# Patient Record
Sex: FEMALE | URBAN - METROPOLITAN AREA
[De-identification: names, ages, dates, MRNs, and addresses within clinical notes are randomized per-mention and may not be internally consistent; named-entity substitution may affect disease eponyms.]

---

## 2023-04-05 DIAGNOSIS — E78.5 HYPERLIPIDEMIA, UNSPECIFIED HYPERLIPIDEMIA TYPE: Primary | ICD-10-CM

## 2023-04-06 RX ORDER — MOMETASONE FUROATE 1 MG/G
CREAM TOPICAL DAILY
COMMUNITY
Start: 2021-02-19 | End: 2023-05-26 | Stop reason: SDUPTHER

## 2023-04-06 RX ORDER — AMITRIPTYLINE HYDROCHLORIDE 50 MG/1
50 TABLET, FILM COATED ORAL NIGHTLY
COMMUNITY
Start: 2013-06-25

## 2023-04-06 RX ORDER — ATORVASTATIN CALCIUM 40 MG/1
40 TABLET, FILM COATED ORAL NIGHTLY
COMMUNITY
Start: 2015-11-02 | End: 2023-05-26 | Stop reason: SDUPTHER

## 2023-04-06 RX ORDER — ALBUTEROL SULFATE 90 UG/1
2 AEROSOL, METERED RESPIRATORY (INHALATION) EVERY 4 HOURS PRN
COMMUNITY
Start: 2015-06-01 | End: 2024-01-19 | Stop reason: ALTCHOICE

## 2023-04-06 RX ORDER — ATORVASTATIN CALCIUM 40 MG/1
40 TABLET, FILM COATED ORAL NIGHTLY
Qty: 30 TABLET | Refills: 0 | Status: SHIPPED | OUTPATIENT
Start: 2023-04-06 | End: 2024-01-19 | Stop reason: WASHOUT

## 2023-04-06 NOTE — TELEPHONE ENCOUNTER
Requested Prescriptions     Pending Prescriptions Disp Refills    atorvastatin (Lipitor) 40 mg tablet [Pharmacy Med Name: Atorvastatin Calcium 40 MG Oral Tablet] 30 tablet 0     Sig: Take 1 tablet (40 mg) by mouth once daily at bedtime.

## 2023-05-26 ENCOUNTER — LAB (OUTPATIENT)
Dept: LAB | Facility: LAB | Age: 53
End: 2023-05-26
Payer: COMMERCIAL

## 2023-05-26 ENCOUNTER — OFFICE VISIT (OUTPATIENT)
Dept: PRIMARY CARE | Facility: CLINIC | Age: 53
End: 2023-05-26
Payer: COMMERCIAL

## 2023-05-26 VITALS
DIASTOLIC BLOOD PRESSURE: 72 MMHG | SYSTOLIC BLOOD PRESSURE: 118 MMHG | HEIGHT: 67 IN | HEART RATE: 77 BPM | BODY MASS INDEX: 39.87 KG/M2 | WEIGHT: 254 LBS | RESPIRATION RATE: 18 BRPM | OXYGEN SATURATION: 97 %

## 2023-05-26 DIAGNOSIS — Z00.00 ENCOUNTER FOR HEALTH MAINTENANCE EXAMINATION: Primary | ICD-10-CM

## 2023-05-26 DIAGNOSIS — E55.9 VITAMIN D DEFICIENCY: ICD-10-CM

## 2023-05-26 DIAGNOSIS — E53.8 VITAMIN B12 DEFICIENCY: ICD-10-CM

## 2023-05-26 DIAGNOSIS — E78.5 HYPERLIPIDEMIA, UNSPECIFIED HYPERLIPIDEMIA TYPE: ICD-10-CM

## 2023-05-26 DIAGNOSIS — Z12.39 ENCOUNTER FOR SCREENING BREAST EXAMINATION: ICD-10-CM

## 2023-05-26 DIAGNOSIS — E66.9 OBESITY (BMI 35.0-39.9 WITHOUT COMORBIDITY): ICD-10-CM

## 2023-05-26 DIAGNOSIS — L30.9 DERMATITIS: ICD-10-CM

## 2023-05-26 DIAGNOSIS — D22.9 MULTIPLE PIGMENTED NEVI: ICD-10-CM

## 2023-05-26 DIAGNOSIS — Z00.00 ENCOUNTER FOR HEALTH MAINTENANCE EXAMINATION: ICD-10-CM

## 2023-05-26 LAB
ALANINE AMINOTRANSFERASE (SGPT) (U/L) IN SER/PLAS: 27 U/L (ref 7–45)
ALBUMIN (G/DL) IN SER/PLAS: 4.2 G/DL (ref 3.4–5)
ALKALINE PHOSPHATASE (U/L) IN SER/PLAS: 76 U/L (ref 33–110)
ANION GAP IN SER/PLAS: 10 MMOL/L (ref 10–20)
ASPARTATE AMINOTRANSFERASE (SGOT) (U/L) IN SER/PLAS: 18 U/L (ref 9–39)
BILIRUBIN TOTAL (MG/DL) IN SER/PLAS: 0.5 MG/DL (ref 0–1.2)
CALCIDIOL (25 OH VITAMIN D3) (NG/ML) IN SER/PLAS: 25 NG/ML
CALCIUM (MG/DL) IN SER/PLAS: 9 MG/DL (ref 8.6–10.3)
CARBON DIOXIDE, TOTAL (MMOL/L) IN SER/PLAS: 27 MMOL/L (ref 21–32)
CHLORIDE (MMOL/L) IN SER/PLAS: 106 MMOL/L (ref 98–107)
CHOLESTEROL (MG/DL) IN SER/PLAS: 133 MG/DL (ref 0–199)
CHOLESTEROL IN HDL (MG/DL) IN SER/PLAS: 35.3 MG/DL
CHOLESTEROL/HDL RATIO: 3.8
COBALAMIN (VITAMIN B12) (PG/ML) IN SER/PLAS: 2280 PG/ML (ref 211–911)
CREATININE (MG/DL) IN SER/PLAS: 0.84 MG/DL (ref 0.5–1.05)
ERYTHROCYTE DISTRIBUTION WIDTH (RATIO) BY AUTOMATED COUNT: 13.4 % (ref 11.5–14.5)
ERYTHROCYTE MEAN CORPUSCULAR HEMOGLOBIN CONCENTRATION (G/DL) BY AUTOMATED: 33 G/DL (ref 32–36)
ERYTHROCYTE MEAN CORPUSCULAR VOLUME (FL) BY AUTOMATED COUNT: 97 FL (ref 80–100)
ERYTHROCYTES (10*6/UL) IN BLOOD BY AUTOMATED COUNT: 5.16 X10E12/L (ref 4–5.2)
ESTIMATED AVERAGE GLUCOSE FOR HBA1C: 111 MG/DL
GFR FEMALE: 83 ML/MIN/1.73M2
GLUCOSE (MG/DL) IN SER/PLAS: 89 MG/DL (ref 74–99)
HEMATOCRIT (%) IN BLOOD BY AUTOMATED COUNT: 50.3 % (ref 36–46)
HEMOGLOBIN (G/DL) IN BLOOD: 16.6 G/DL (ref 12–16)
HEMOGLOBIN A1C/HEMOGLOBIN TOTAL IN BLOOD: 5.5 %
LDL: 87 MG/DL (ref 0–99)
LEUKOCYTES (10*3/UL) IN BLOOD BY AUTOMATED COUNT: 9.4 X10E9/L (ref 4.4–11.3)
PLATELETS (10*3/UL) IN BLOOD AUTOMATED COUNT: 216 X10E9/L (ref 150–450)
POTASSIUM (MMOL/L) IN SER/PLAS: 4.4 MMOL/L (ref 3.5–5.3)
PROTEIN TOTAL: 6.8 G/DL (ref 6.4–8.2)
SODIUM (MMOL/L) IN SER/PLAS: 139 MMOL/L (ref 136–145)
TRIGLYCERIDE (MG/DL) IN SER/PLAS: 55 MG/DL (ref 0–149)
UREA NITROGEN (MG/DL) IN SER/PLAS: 12 MG/DL (ref 6–23)
VLDL: 11 MG/DL (ref 0–40)

## 2023-05-26 PROCEDURE — 83036 HEMOGLOBIN GLYCOSYLATED A1C: CPT

## 2023-05-26 PROCEDURE — 85027 COMPLETE CBC AUTOMATED: CPT

## 2023-05-26 PROCEDURE — 82306 VITAMIN D 25 HYDROXY: CPT

## 2023-05-26 PROCEDURE — 99396 PREV VISIT EST AGE 40-64: CPT | Performed by: FAMILY MEDICINE

## 2023-05-26 PROCEDURE — 36415 COLL VENOUS BLD VENIPUNCTURE: CPT

## 2023-05-26 PROCEDURE — 82607 VITAMIN B-12: CPT

## 2023-05-26 PROCEDURE — 80053 COMPREHEN METABOLIC PANEL: CPT

## 2023-05-26 PROCEDURE — 80061 LIPID PANEL: CPT

## 2023-05-26 RX ORDER — MOMETASONE FUROATE 1 MG/G
CREAM TOPICAL DAILY
Qty: 45 G | Refills: 1 | Status: SHIPPED | OUTPATIENT
Start: 2023-05-26 | End: 2024-06-07 | Stop reason: SDUPTHER

## 2023-05-26 RX ORDER — ATORVASTATIN CALCIUM 40 MG/1
40 TABLET, FILM COATED ORAL NIGHTLY
Qty: 90 TABLET | Refills: 1 | Status: SHIPPED | OUTPATIENT
Start: 2023-05-26 | End: 2023-10-11

## 2023-05-26 ASSESSMENT — PATIENT HEALTH QUESTIONNAIRE - PHQ9
1. LITTLE INTEREST OR PLEASURE IN DOING THINGS: MORE THAN HALF THE DAYS
SUM OF ALL RESPONSES TO PHQ QUESTIONS 1-9: 7
3. TROUBLE FALLING OR STAYING ASLEEP OR SLEEPING TOO MUCH: SEVERAL DAYS
SUM OF ALL RESPONSES TO PHQ9 QUESTIONS 1 AND 2: 3
7. TROUBLE CONCENTRATING ON THINGS, SUCH AS READING THE NEWSPAPER OR WATCHING TELEVISION: NOT AT ALL
8. MOVING OR SPEAKING SO SLOWLY THAT OTHER PEOPLE COULD HAVE NOTICED. OR THE OPPOSITE, BEING SO FIGETY OR RESTLESS THAT YOU HAVE BEEN MOVING AROUND A LOT MORE THAN USUAL: NOT AT ALL
10. IF YOU CHECKED OFF ANY PROBLEMS, HOW DIFFICULT HAVE THESE PROBLEMS MADE IT FOR YOU TO DO YOUR WORK, TAKE CARE OF THINGS AT HOME, OR GET ALONG WITH OTHER PEOPLE: NOT DIFFICULT AT ALL
2. FEELING DOWN, DEPRESSED OR HOPELESS: SEVERAL DAYS
9. THOUGHTS THAT YOU WOULD BE BETTER OFF DEAD, OR OF HURTING YOURSELF: NOT AT ALL
6. FEELING BAD ABOUT YOURSELF - OR THAT YOU ARE A FAILURE OR HAVE LET YOURSELF OR YOUR FAMILY DOWN: SEVERAL DAYS
5. POOR APPETITE OR OVEREATING: NOT AT ALL
4. FEELING TIRED OR HAVING LITTLE ENERGY: MORE THAN HALF THE DAYS

## 2023-05-26 ASSESSMENT — ENCOUNTER SYMPTOMS
LOSS OF SENSATION IN FEET: 0
OCCASIONAL FEELINGS OF UNSTEADINESS: 0
DEPRESSION: 0

## 2023-05-26 NOTE — PROGRESS NOTES
Subjective   Patient ID: Nery Devi is a 52 y.o. female who presents for Annual Exam and Med Refill.    HPI   Patient comes in today with her  for physical exam.  She is currently without complaints.  She is taking her medicines as directed without problems.  She needs refills.    Family history: Mom  2022 85-year-old.  Patient has 3 brothers and 3 sisters all alive and well, patient is in the middle.  Father's health is unknown.        2022  Patient comes in today for physical exam. She is currently without complaints. She needs a refill on her medicines.    2021  Patient presents today for 6-month checkup and refill of meds. She currently is without complaints. She states that she is taking her medicines as directed and is not having any side effects from them. She would like a flu shot today.    2021  Patient presents today for 6-month checkup and refill of meds. She currently is without complaints. She states that she is taking her medicines as directed and is not having any side effects from them.    Patient is having problems with sore or tender drying and cracking of her fingers on both hands. 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 She has tried a number of ointments and creams for her hands all without much success.    Patient is having some problems with dry eyes and she is requesting something for that.    2020  Patient comes in today with her son and  for annual wellness physicals. She has paperwork from work that these be completed as well. She is currently without complaints.    2020   Nery Carter is here for flu symptoms, has been sick since Friday, has taken OTC with no relief. chronic cough, chest congestion. Has gotten worse over the weekend. She did have a fever last week but doesn't have one today. She describes her cough is a dry hacking cough and claims she has a sore throat probably from the coughing.  "She did get a flu shot this year. She denies earaches or GI symptoms.     Review of Systems   All other systems reviewed and are negative.      Objective   /72   Pulse 77   Resp 18   Ht 1.702 m (5' 7\")   Wt 115 kg (254 lb)   LMP 04/28/2023 (Approximate)   SpO2 97%   BMI 39.78 kg/m²     Physical Exam  Vitals reviewed.   Constitutional:       Appearance: She is well-developed. She is obese.      Comments: Severely obese 52-year-old  female with purple streaks in her hair   HENT:      Head: Normocephalic and atraumatic.      Right Ear: Tympanic membrane, ear canal and external ear normal.      Left Ear: Tympanic membrane, ear canal and external ear normal.      Nose: Nose normal.      Mouth/Throat:      Mouth: Mucous membranes are moist.      Pharynx: Oropharynx is clear.   Eyes:      Extraocular Movements: Extraocular movements intact.      Conjunctiva/sclera: Conjunctivae normal.      Pupils: Pupils are equal, round, and reactive to light.   Cardiovascular:      Rate and Rhythm: Normal rate and regular rhythm.      Heart sounds: Normal heart sounds. No murmur heard.  Pulmonary:      Effort: Pulmonary effort is normal.      Breath sounds: Normal breath sounds. No wheezing.   Abdominal:      General: Abdomen is flat. Bowel sounds are normal.      Palpations: Abdomen is soft.   Musculoskeletal:         General: Normal range of motion.   Skin:     General: Skin is warm and dry.   Neurological:      General: No focal deficit present.      Mental Status: She is alert and oriented to person, place, and time. Mental status is at baseline.   Psychiatric:         Mood and Affect: Mood normal.         Behavior: Behavior normal.         Thought Content: Thought content normal.         Judgment: Judgment normal.         Assessment/Plan   Problem List Items Addressed This Visit       Encounter for screening breast examination - Primary    Relevant Orders    BI mammo bilateral screening tomosynthesis    " Vitamin D deficiency    Relevant Orders    Vitamin D, Total    Hyperlipidemia    Relevant Medications    atorvastatin (Lipitor) 40 mg tablet    Other Relevant Orders    Lipid Panel    Vitamin B12 deficiency    Relevant Orders    CBC    Vitamin B12     Other Visit Diagnoses       Dermatitis        Relevant Medications    mometasone (Elocon) 0.1 % cream    Multiple pigmented nevi        Relevant Orders    Referral to Dermatology        Will contact patient with lab results when available.  Will contact patient with mammogram results when available.  Continue current meds as directed.  Follow up in 6 months for recheck if all remains stable, sooner if problems arise.  Medication list reconciled.  Thank you for visiting today!      Professional services: Some of this note was completed using Dragon voice technology and sometimes the software misinterprets words. This may include unintended errors with respect to translation of words, typographical errors or grammar errors which may not have been identified prior to finalization of the chart note. Please take this into account when reading the note. Thank you.

## 2023-06-09 ENCOUNTER — TELEPHONE (OUTPATIENT)
Dept: PRIMARY CARE | Facility: CLINIC | Age: 53
End: 2023-06-09
Payer: COMMERCIAL

## 2023-06-09 NOTE — TELEPHONE ENCOUNTER
----- Message from Phil Millan DO sent at 6/7/2023  8:21 AM EDT -----  Regarding: Labs  Please notify patient of high B12 of 2280.  Normal range is 211-911.  Would recommend stopping any B12 supplements for now.    Vitamin D level was low at 25.  It should be between 30 and 100 the closer to 50 the better. It is an important vitamin for your heart, lungs, immune system and bones among other things in your body. Would recommend OTC vitamin D3 2000 units daily to get it into and keep it in the normal range and recheck in October 2023.     All the rest of the labs are stable.  Continue current medicines and recheck in 1 year.  ----- Message -----  From: Lab, Background User  Sent: 5/26/2023  11:58 AM EDT  To: Phil Millan DO

## 2023-07-10 ENCOUNTER — OFFICE VISIT (OUTPATIENT)
Dept: PRIMARY CARE | Facility: CLINIC | Age: 53
End: 2023-07-10
Payer: COMMERCIAL

## 2023-07-10 VITALS
TEMPERATURE: 97.5 F | DIASTOLIC BLOOD PRESSURE: 80 MMHG | HEART RATE: 86 BPM | SYSTOLIC BLOOD PRESSURE: 119 MMHG | WEIGHT: 253 LBS | RESPIRATION RATE: 18 BRPM | BODY MASS INDEX: 39.63 KG/M2 | OXYGEN SATURATION: 96 %

## 2023-07-10 DIAGNOSIS — M79.642 LEFT HAND PAIN: Primary | ICD-10-CM

## 2023-07-10 PROCEDURE — 99213 OFFICE O/P EST LOW 20 MIN: CPT | Performed by: FAMILY MEDICINE

## 2023-07-10 RX ORDER — IBUPROFEN 800 MG/1
800 TABLET ORAL 3 TIMES DAILY PRN
Qty: 60 TABLET | Refills: 0 | Status: SHIPPED | OUTPATIENT
Start: 2023-07-10 | End: 2023-09-08

## 2023-07-10 ASSESSMENT — PAIN SCALES - GENERAL: PAINLEVEL: 8

## 2023-07-10 NOTE — PROGRESS NOTES
Subjective   Patient ID: Nery Devi is a 52 y.o. female who presents for Hand Injury (L palm/thumb area, fell approx 1 mo ago at the gas station trying to step over hose and slipped on oil. ).    HPI   Patient comes in today for evaluation of her left thumb.  She states that she fell about a month ago at a gas station as she was trying to step over a gas hose and slipped on oil on the pavement.  She fell onto her left hand and left arm.  Initially the arm was more painful than it is today however she is still having discomfort in the base of her thumb to palpation and pressure.  She states she can move the thumb okay in all directions but pressure over the thumb on the palmar surface is quite painful.    Review of Systems   All other systems reviewed and are negative.      Objective   /80   Pulse 86   Temp 36.4 °C (97.5 °F)   Resp 18   Wt 115 kg (253 lb)   LMP  (LMP Unknown)   SpO2 96%   BMI 39.63 kg/m²     Physical Exam  Vitals reviewed.   Constitutional:       Appearance: She is well-developed.   HENT:      Head: Normocephalic and atraumatic.      Right Ear: Tympanic membrane, ear canal and external ear normal.      Left Ear: Tympanic membrane, ear canal and external ear normal.      Nose: Nose normal.      Mouth/Throat:      Mouth: Mucous membranes are moist.      Pharynx: Oropharynx is clear.   Eyes:      Extraocular Movements: Extraocular movements intact.      Conjunctiva/sclera: Conjunctivae normal.      Pupils: Pupils are equal, round, and reactive to light.   Cardiovascular:      Rate and Rhythm: Normal rate and regular rhythm.      Heart sounds: Normal heart sounds. No murmur heard.  Pulmonary:      Effort: Pulmonary effort is normal.      Breath sounds: Normal breath sounds. No wheezing.   Abdominal:      General: Abdomen is flat. Bowel sounds are normal.      Palpations: Abdomen is soft.   Musculoskeletal:         General: Signs of injury (Pain to palpation over the palmar surface of  the base of the left thumb.) present. Normal range of motion.   Skin:     General: Skin is warm and dry.   Neurological:      General: No focal deficit present.      Mental Status: She is alert and oriented to person, place, and time. Mental status is at baseline.   Psychiatric:         Mood and Affect: Mood normal.         Behavior: Behavior normal.         Thought Content: Thought content normal.         Judgment: Judgment normal.         Assessment/Plan   Problem List Items Addressed This Visit    None  Visit Diagnoses       Left hand pain    -  Primary    Relevant Medications    ibuprofen 800 mg tablet    Other Relevant Orders    XR hand left 3+ views        Will contact patient with x-ray results when they are available.  Soak left hand in warm water with Epsom salts for 10-15 minutes 2-3 times a day for the next 7-10 days.   Use ibuprofen 800 mg 3 times a day with food for pain and swelling.  RTC in 10 days for recheck if not better, sooner if condition worsens.  Medication list reconciled.  Thank you for visiting today!      Professional services: Some of this note was completed using Dragon voice technology and sometimes the software misinterprets words. This may include unintended errors with respect to translation of words, typographical errors or grammar errors which may not have been identified prior to finalization of the chart note. Please take this into account when reading the note. Thank you.

## 2023-07-20 ENCOUNTER — TELEPHONE (OUTPATIENT)
Dept: PRIMARY CARE | Facility: CLINIC | Age: 53
End: 2023-07-20
Payer: COMMERCIAL

## 2023-07-20 NOTE — TELEPHONE ENCOUNTER
----- Message from Phil Millan DO sent at 7/20/2023  8:43 AM EDT -----  Regarding: Mammograms  Please notify patient of normal mammograms, recheck in 1 year.  ----- Message -----  From: [facility] - Radiology Results In  Sent: 7/19/2023   5:02 PM EDT  To: Phil Millan DO

## 2023-08-28 ENCOUNTER — OFFICE VISIT (OUTPATIENT)
Dept: PRIMARY CARE | Facility: CLINIC | Age: 53
End: 2023-08-28
Payer: COMMERCIAL

## 2023-08-28 VITALS
BODY MASS INDEX: 40.1 KG/M2 | TEMPERATURE: 97.8 F | WEIGHT: 256 LBS | SYSTOLIC BLOOD PRESSURE: 121 MMHG | OXYGEN SATURATION: 94 % | DIASTOLIC BLOOD PRESSURE: 83 MMHG | HEART RATE: 90 BPM | RESPIRATION RATE: 16 BRPM

## 2023-08-28 DIAGNOSIS — H10.33 ACUTE CONJUNCTIVITIS OF BOTH EYES, UNSPECIFIED ACUTE CONJUNCTIVITIS TYPE: Primary | ICD-10-CM

## 2023-08-28 DIAGNOSIS — E66.01 MORBID OBESITY WITH BODY MASS INDEX (BMI) OF 40.0 TO 44.9 IN ADULT (MULTI): ICD-10-CM

## 2023-08-28 PROCEDURE — 99213 OFFICE O/P EST LOW 20 MIN: CPT | Performed by: FAMILY MEDICINE

## 2023-08-28 RX ORDER — SULFACETAMIDE SODIUM 100 MG/ML
2 SOLUTION/ DROPS OPHTHALMIC 4 TIMES DAILY
Qty: 15 ML | Refills: 0 | Status: SHIPPED | OUTPATIENT
Start: 2023-08-28 | End: 2023-10-05

## 2023-08-28 NOTE — PROGRESS NOTES
Subjective   Patient ID: Nery Devi is a 53 y.o. female who presents for Eye Problem (Krishna eye problem, started getting itchy and watery on thursday. Stuck together in the morning. ).    HPI   Patient comes in today complaining of bilateral eye irritation.  She states both of her eyes were matted together and stuck together this morning when she got up.  Both conjunctiva are red.    Review of Systems   All other systems reviewed and are negative.      Objective   /83   Pulse 90   Temp 36.6 °C (97.8 °F)   Resp 16   Wt 116 kg (256 lb)   SpO2 94%   BMI 40.10 kg/m²     Physical Exam  Vitals reviewed.   Constitutional:       Appearance: She is morbidly obese.      Comments: Morbidly obese 53-year-old  female   HENT:      Head: Normocephalic and atraumatic.      Right Ear: Tympanic membrane, ear canal and external ear normal.      Left Ear: Tympanic membrane, ear canal and external ear normal.      Nose: Nose normal.      Mouth/Throat:      Mouth: Mucous membranes are moist.      Pharynx: Oropharynx is clear.   Eyes:      General:         Right eye: Discharge present.         Left eye: Discharge present.     Extraocular Movements: Extraocular movements intact.      Pupils: Pupils are equal, round, and reactive to light.      Comments: Redness and injection of both conjunctiva   Cardiovascular:      Rate and Rhythm: Normal rate and regular rhythm.      Heart sounds: Normal heart sounds. No murmur heard.  Pulmonary:      Effort: Pulmonary effort is normal.      Breath sounds: Normal breath sounds. No wheezing.   Abdominal:      General: Abdomen is flat. Bowel sounds are normal.      Palpations: Abdomen is soft.   Musculoskeletal:         General: Normal range of motion.   Skin:     General: Skin is warm and dry.   Neurological:      General: No focal deficit present.      Mental Status: She is alert and oriented to person, place, and time. Mental status is at baseline.   Psychiatric:         Mood  and Affect: Mood normal.         Behavior: Behavior normal.         Thought Content: Thought content normal.         Judgment: Judgment normal.         Assessment/Plan   Problem List Items Addressed This Visit       Morbid obesity with body mass index (BMI) of 40.0 to 44.9 in adult (CMS/[facility])    Acute conjunctivitis of both eyes - Primary    Relevant Medications    sulfacetamide (Bleph-10) 10 % ophthalmic solution   Use new meds as directed.  Followup in one week for recheck if all remains stable, sooner if problems arise.  Medication list reconciled.  Thank you for visiting today!      Professional services: Some of this note was completed using Dragon voice technology and sometimes the software misinterprets words. This may include unintended errors with respect to translation of words, typographical errors or grammar errors which may not have been identified prior to finalization of the chart note. Please take this into account when reading the note. Thank you.

## 2023-08-29 PROBLEM — E66.01 MORBID OBESITY WITH BODY MASS INDEX (BMI) OF 40.0 TO 44.9 IN ADULT (MULTI): Status: ACTIVE | Noted: 2023-08-29

## 2023-08-29 PROBLEM — H10.33 ACUTE CONJUNCTIVITIS OF BOTH EYES: Status: ACTIVE | Noted: 2023-08-29

## 2023-10-11 DIAGNOSIS — E53.8 VITAMIN B12 DEFICIENCY: ICD-10-CM

## 2023-10-11 DIAGNOSIS — E78.5 HYPERLIPIDEMIA, UNSPECIFIED HYPERLIPIDEMIA TYPE: ICD-10-CM

## 2023-10-11 DIAGNOSIS — E55.9 VITAMIN D DEFICIENCY: ICD-10-CM

## 2023-10-11 RX ORDER — ATORVASTATIN CALCIUM 40 MG/1
40 TABLET, FILM COATED ORAL NIGHTLY
Qty: 90 TABLET | Refills: 1 | Status: SHIPPED | OUTPATIENT
Start: 2023-10-11 | End: 2024-03-21

## 2023-10-11 NOTE — TELEPHONE ENCOUNTER
Requested Prescriptions     Pending Prescriptions Disp Refills    atorvastatin (Lipitor) 40 mg tablet [Pharmacy Med Name: Atorvastatin Calcium 40 MG Oral Tablet] 90 tablet 1     Sig: Take 1 tablet (40 mg) by mouth once daily at bedtime.     Pt also due for BW per [facility] (blue sticky/June results)

## 2023-10-13 ENCOUNTER — APPOINTMENT (OUTPATIENT)
Dept: PRIMARY CARE | Facility: CLINIC | Age: 53
End: 2023-10-13
Payer: COMMERCIAL

## 2023-10-13 ENCOUNTER — LAB (OUTPATIENT)
Dept: LAB | Facility: LAB | Age: 53
End: 2023-10-13
Payer: COMMERCIAL

## 2023-10-13 DIAGNOSIS — E55.9 VITAMIN D DEFICIENCY: ICD-10-CM

## 2023-10-13 DIAGNOSIS — E53.8 VITAMIN B12 DEFICIENCY: ICD-10-CM

## 2023-10-13 LAB
25(OH)D3 SERPL-MCNC: 39 NG/ML (ref 30–100)
VIT B12 SERPL-MCNC: 502 PG/ML (ref 211–911)

## 2023-10-13 PROCEDURE — 82607 VITAMIN B-12: CPT

## 2023-10-13 PROCEDURE — 36415 COLL VENOUS BLD VENIPUNCTURE: CPT

## 2023-10-13 PROCEDURE — 82306 VITAMIN D 25 HYDROXY: CPT

## 2024-01-19 ENCOUNTER — OFFICE VISIT (OUTPATIENT)
Dept: PRIMARY CARE | Facility: CLINIC | Age: 54
End: 2024-01-19
Payer: COMMERCIAL

## 2024-01-19 VITALS
SYSTOLIC BLOOD PRESSURE: 130 MMHG | DIASTOLIC BLOOD PRESSURE: 86 MMHG | TEMPERATURE: 98.4 F | BODY MASS INDEX: 41.82 KG/M2 | RESPIRATION RATE: 20 BRPM | WEIGHT: 267 LBS | HEART RATE: 92 BPM

## 2024-01-19 DIAGNOSIS — F17.200 SMOKER UNMOTIVATED TO QUIT: ICD-10-CM

## 2024-01-19 DIAGNOSIS — J20.9 ACUTE BRONCHITIS, UNSPECIFIED ORGANISM: Primary | ICD-10-CM

## 2024-01-19 PROCEDURE — 99213 OFFICE O/P EST LOW 20 MIN: CPT | Performed by: FAMILY MEDICINE

## 2024-01-19 PROCEDURE — 3008F BODY MASS INDEX DOCD: CPT | Performed by: FAMILY MEDICINE

## 2024-01-19 RX ORDER — AZITHROMYCIN 250 MG/1
TABLET, FILM COATED ORAL
Qty: 6 TABLET | Refills: 0 | Status: SHIPPED | OUTPATIENT
Start: 2024-01-19 | End: 2024-01-23

## 2024-01-19 NOTE — PROGRESS NOTES
Subjective   Patient ID: Nery Devi is a 53 y.o. female who presents for URI (Cough (wet but non productive), chest pain, throat irritation from coughing, sinus pain (dry irritation). Has been going on for about 3 days.).    HPI   Patient comes in today with her  Zia for evaluation of cough and congestion.  Her  also has the same symptoms.  He has had his for 2 weeks and she for 3 days.  Both are cigarette smokers.  She complains of a semiproductive cough as well as sinus congestion and drainage.  She did a COVID test yesterday which was negative.    8/28/2023  Patient comes in today complaining of bilateral eye irritation.  She states both of her eyes were matted together and stuck together this morning when she got up.  Both conjunctiva are red.    7/10/2023  Patient comes in today for evaluation of her left thumb.  She states that she fell about a month ago at a gas station as she was trying to step over a gas hose and slipped on oil on the pavement.  She fell onto her left hand and left arm.  Initially the arm was more painful than it is today however she is still having discomfort in the base of her thumb to palpation and pressure.  She states she can move the thumb okay in all directions but pressure over the thumb on the palmar surface is quite painful.    Review of Systems   All other systems reviewed and are negative.      Objective   /86   Pulse 92   Temp 36.9 °C (98.4 °F)   Resp 20   Wt 121 kg (267 lb)   BMI 41.82 kg/m²     Physical Exam  Vitals reviewed.   Constitutional:       Appearance: She is well-developed. She is morbidly obese.   HENT:      Head: Normocephalic and atraumatic.      Right Ear: Tympanic membrane, ear canal and external ear normal.      Left Ear: Tympanic membrane, ear canal and external ear normal.      Nose: Nose normal.      Mouth/Throat:      Mouth: Mucous membranes are moist.      Pharynx: Oropharynx is clear.   Eyes:      Extraocular Movements:  Extraocular movements intact.      Conjunctiva/sclera: Conjunctivae normal.      Pupils: Pupils are equal, round, and reactive to light.   Cardiovascular:      Rate and Rhythm: Normal rate and regular rhythm.      Heart sounds: Normal heart sounds. No murmur heard.  Pulmonary:      Effort: Pulmonary effort is normal.      Breath sounds: Rhonchi (Coarse scattered rhonchi bilateral lung fields without wheeze) present. No wheezing.   Abdominal:      General: Abdomen is flat. Bowel sounds are normal.      Palpations: Abdomen is soft.   Musculoskeletal:         General: Normal range of motion.   Skin:     General: Skin is warm and dry.   Neurological:      General: No focal deficit present.      Mental Status: She is alert and oriented to person, place, and time. Mental status is at baseline.   Psychiatric:         Mood and Affect: Mood normal.         Behavior: Behavior normal.         Thought Content: Thought content normal.         Judgment: Judgment normal.       Assessment/Plan   Problem List Items Addressed This Visit    None  Visit Diagnoses         Codes    Acute bronchitis, unspecified organism    -  Primary J20.9    Relevant Medications    azithromycin (Zithromax) 250 mg tablet    Smoker unmotivated to quit     F17.200        Use Mucinex DM 1 every 12 hrs as needed for cough  Use meds as directed.  Return to clinic if symptoms do not improve in 10-14 days.    Should the condition worsen contact me and return here or if after hours seek further evaluation at an urgent care or in the emergency room.  Medication list reconciled.  Thank you for visiting today!      Professional services: Some of this note was completed using Dragon voice technology and sometimes the software misinterprets words. This may include unintended errors with respect to translation of words, typographical errors or grammar errors which may not have been identified prior to finalization of the chart note. Please take this into account when  reading the note. Thank you.

## 2024-01-26 ENCOUNTER — TELEPHONE (OUTPATIENT)
Dept: PRIMARY CARE | Facility: CLINIC | Age: 54
End: 2024-01-26
Payer: COMMERCIAL

## 2024-01-26 DIAGNOSIS — J20.9 ACUTE BRONCHITIS, UNSPECIFIED ORGANISM: Primary | ICD-10-CM

## 2024-01-26 RX ORDER — AMOXICILLIN AND CLAVULANATE POTASSIUM 875; 125 MG/1; MG/1
875 TABLET, FILM COATED ORAL 2 TIMES DAILY
Qty: 20 TABLET | Refills: 0 | Status: SHIPPED | OUTPATIENT
Start: 2024-01-26 | End: 2024-02-05

## 2024-01-26 NOTE — TELEPHONE ENCOUNTER
Patient called stating that she has finished taking the antibiotic and that she is still not feeling any better.  Patient is asking if another antibiotic can be sent into her pharmacy or if she needs another appointment?  Patient can be reached at [phone].      Thank You

## 2024-01-26 NOTE — TELEPHONE ENCOUNTER
Please notify patient that a prescription for new antibiotic was sent to [facility].  Thank you.

## 2024-03-21 ENCOUNTER — OFFICE VISIT (OUTPATIENT)
Dept: PRIMARY CARE | Facility: CLINIC | Age: 54
End: 2024-03-21
Payer: COMMERCIAL

## 2024-03-21 VITALS
TEMPERATURE: 97.3 F | DIASTOLIC BLOOD PRESSURE: 82 MMHG | RESPIRATION RATE: 16 BRPM | HEART RATE: 89 BPM | SYSTOLIC BLOOD PRESSURE: 118 MMHG | WEIGHT: 262 LBS | OXYGEN SATURATION: 97 % | BODY MASS INDEX: 41.04 KG/M2

## 2024-03-21 DIAGNOSIS — K52.9 ACUTE GASTROENTERITIS: Primary | ICD-10-CM

## 2024-03-21 DIAGNOSIS — E78.5 HYPERLIPIDEMIA, UNSPECIFIED HYPERLIPIDEMIA TYPE: ICD-10-CM

## 2024-03-21 DIAGNOSIS — E66.01 MORBID OBESITY WITH BODY MASS INDEX (BMI) OF 40.0 TO 44.9 IN ADULT (MULTI): ICD-10-CM

## 2024-03-21 PROCEDURE — 99213 OFFICE O/P EST LOW 20 MIN: CPT | Performed by: FAMILY MEDICINE

## 2024-03-21 RX ORDER — ATORVASTATIN CALCIUM 40 MG/1
40 TABLET, FILM COATED ORAL NIGHTLY
Qty: 90 TABLET | Refills: 1 | Status: SHIPPED | OUTPATIENT
Start: 2024-03-21 | End: 2024-06-07 | Stop reason: SDUPTHER

## 2024-03-21 NOTE — LETTER
March 21, 2024     Patient: Nery Devi   YOB: 1970   Date of Visit: 3/21/2024       To Whom It May Concern:    Nery Devi was seen in my clinic on 3/21/2024 at 9:40 am. Please excuse Nery Carter for her absence from work on this day to make the appointment.    If you have any questions or concerns, please don't hesitate to call.         Sincerely,         Phil Millan,         CC: No Recipients

## 2024-03-21 NOTE — PROGRESS NOTES
Subjective   Patient ID: Nery Devi is a 53 y.o. female who presents for Abdominal Pain (Has had a stomach bug since Monday, she is having stomach cramps and diarrhea).    HPI   Today complaining of stomach pains.  She attended a [employer] party on Saturday with about 10 people and had some corned beef and on Sunday she states she felt fine.  She had some eggs to eat for Sunday breakfast.  Sunday night and Monday she felt terrible.  She had abdominal pain and diarrhea and vomiting.  She states she had emesis about 4 times Monday and diarrhea around 5 times.  She did not eat anything.  She claims her fever was 100.7 and she was shaking.    On Tuesday she did not have any diarrhea but she had not not much to eat.  Her sister who was at the party on Saturday got sick as well with GI symptoms.    On Wednesday patient did not have any symptoms but did not feel well.    Today she has been having stomach cramping and abdominal discomfort which she describes as a 7 out of 10.  She has not been to work all week.  She did try to go in today but was sent home after 30 minutes.  She did have 1 emesis last evening of mucus and today she is having a lot of belching and passing gas.  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  Her  also has the same symptoms.  He has had his for 2 weeks and she for 3 days.  Both are cigarette smokers.  She complains of a semiproductive cough as well as sinus congestion and drainage.  She did a COVID test yesterday which was negative.    8/28/2023  Patient comes in today complaining of bilateral eye irritation.  She states both of her eyes were matted together and stuck together this morning when she got up.  Both conjunctiva are red.    7/10/2023  Patient comes in today for evaluation of her left thumb.  She states that she fell about a month ago at a gas station as  she was trying to step over a gas hose and slipped on oil on the pavement.  She fell onto her left hand and left arm.  Initially the arm was more painful than it is today however she is still having discomfort in the base of her thumb to palpation and pressure.  She states she can move the thumb okay in all directions but pressure over the thumb on the palmar surface is quite painful.    2023  Patient comes in today with her  for physical exam.  She is currently without complaints.  She is taking her medicines as directed without problems.  She needs refills.    Family history: Mom  2022 85-year-old.  Patient has 3 brothers and 3 sisters all alive and well, patient is in the middle.  Father's health is unknown.    Review of Systems   All other systems reviewed and are negative.      Objective   /82   Pulse 89   Temp 36.3 °C (97.3 °F)   Resp 16   Wt 119 kg (262 lb)   LMP  (LMP Unknown)   SpO2 97%   BMI 41.04 kg/m²     Physical Exam  Vitals reviewed.   Constitutional:       Appearance: She is well-developed. She is morbidly obese.   HENT:      Head: Normocephalic and atraumatic.      Right Ear: Tympanic membrane, ear canal and external ear normal.      Left Ear: Tympanic membrane, ear canal and external ear normal.      Nose: Nose normal.      Mouth/Throat:      Mouth: Mucous membranes are moist.      Pharynx: Oropharynx is clear.   Eyes:      Extraocular Movements: Extraocular movements intact.      Conjunctiva/sclera: Conjunctivae normal.      Pupils: Pupils are equal, round, and reactive to light.   Cardiovascular:      Rate and Rhythm: Normal rate and regular rhythm.      Heart sounds: Normal heart sounds. No murmur heard.  Pulmonary:      Effort: Pulmonary effort is normal.      Breath sounds: Normal breath sounds. No wheezing.   Abdominal:      General: Abdomen is flat. Bowel sounds are normal.      Palpations: Abdomen is soft.   Musculoskeletal:         General: Normal range of  motion.   Skin:     General: Skin is warm and dry.   Neurological:      General: No focal deficit present.      Mental Status: She is alert and oriented to person, place, and time. Mental status is at baseline.   Psychiatric:         Mood and Affect: Mood normal.         Behavior: Behavior normal.         Thought Content: Thought content normal.         Judgment: Judgment normal.       Assessment/Plan   Problem List Items Addressed This Visit             ICD-10-CM    Hyperlipidemia E78.5    Relevant Medications    atorvastatin (Lipitor) 40 mg tablet    Morbid obesity with body mass index (BMI) of 40.0 to 44.9 in adult (CMS/[facility]) E66.01, Z68.41     Other Visit Diagnoses         Codes    Acute gastroenteritis    -  Primary K52.9        Clear liquid diet today (sprite, water, ginger ale, gatorade, etc).  Advance to a BRAT diet (bananas, rice, applesauce & toast) tomorrow and resume a regular diet on Saturday. Contact me if condition worsens or does not improve.  Note given for out of work until Monday.  Medication list reconciled.  Thank you for visiting today!      Professional services: Some of this note was completed using Dragon voice technology and sometimes the software misinterprets words. This may include unintended errors with respect to translation of words, typographical errors or grammar errors which may not have been identified prior to finalization of the chart note. Please take this into account when reading the note. Thank you.

## 2024-06-07 ENCOUNTER — OFFICE VISIT (OUTPATIENT)
Dept: PRIMARY CARE | Facility: CLINIC | Age: 54
End: 2024-06-07
Payer: COMMERCIAL

## 2024-06-07 ENCOUNTER — LAB (OUTPATIENT)
Dept: LAB | Facility: LAB | Age: 54
End: 2024-06-07
Payer: COMMERCIAL

## 2024-06-07 VITALS
HEART RATE: 89 BPM | SYSTOLIC BLOOD PRESSURE: 121 MMHG | OXYGEN SATURATION: 94 % | RESPIRATION RATE: 20 BRPM | BODY MASS INDEX: 40.81 KG/M2 | HEIGHT: 67 IN | WEIGHT: 260 LBS | DIASTOLIC BLOOD PRESSURE: 84 MMHG | TEMPERATURE: 97.8 F

## 2024-06-07 DIAGNOSIS — Z11.59 ENCOUNTER FOR HEPATITIS C SCREENING TEST FOR LOW RISK PATIENT: ICD-10-CM

## 2024-06-07 DIAGNOSIS — Z12.31 ENCOUNTER FOR SCREENING MAMMOGRAM FOR BREAST CANCER: ICD-10-CM

## 2024-06-07 DIAGNOSIS — E53.8 VITAMIN B12 DEFICIENCY: ICD-10-CM

## 2024-06-07 DIAGNOSIS — E78.5 HYPERLIPIDEMIA, UNSPECIFIED HYPERLIPIDEMIA TYPE: ICD-10-CM

## 2024-06-07 DIAGNOSIS — L30.9 DERMATITIS: ICD-10-CM

## 2024-06-07 DIAGNOSIS — Z00.00 ENCOUNTER FOR HEALTH MAINTENANCE EXAMINATION: ICD-10-CM

## 2024-06-07 DIAGNOSIS — Z00.00 ENCOUNTER FOR HEALTH MAINTENANCE EXAMINATION: Primary | ICD-10-CM

## 2024-06-07 DIAGNOSIS — E55.9 VITAMIN D DEFICIENCY: ICD-10-CM

## 2024-06-07 LAB
25(OH)D3 SERPL-MCNC: 35 NG/ML (ref 30–100)
ALBUMIN SERPL BCP-MCNC: 4.6 G/DL (ref 3.4–5)
ALP SERPL-CCNC: 95 U/L (ref 33–110)
ALT SERPL W P-5'-P-CCNC: 29 U/L (ref 7–45)
ANION GAP SERPL CALC-SCNC: 14 MMOL/L (ref 10–20)
AST SERPL W P-5'-P-CCNC: 21 U/L (ref 9–39)
BILIRUB SERPL-MCNC: 0.5 MG/DL (ref 0–1.2)
BUN SERPL-MCNC: 15 MG/DL (ref 6–23)
CALCIUM SERPL-MCNC: 9.7 MG/DL (ref 8.6–10.3)
CHLORIDE SERPL-SCNC: 106 MMOL/L (ref 98–107)
CHOLEST SERPL-MCNC: 149 MG/DL (ref 0–199)
CHOLESTEROL/HDL RATIO: 4.2
CO2 SERPL-SCNC: 25 MMOL/L (ref 21–32)
CREAT SERPL-MCNC: 0.88 MG/DL (ref 0.5–1.05)
EGFRCR SERPLBLD CKD-EPI 2021: 78 ML/MIN/1.73M*2
ERYTHROCYTE [DISTWIDTH] IN BLOOD BY AUTOMATED COUNT: 13.5 % (ref 11.5–14.5)
GLUCOSE SERPL-MCNC: 109 MG/DL (ref 74–99)
HCT VFR BLD AUTO: 51.2 % (ref 36–46)
HCV AB SER QL: NONREACTIVE
HDLC SERPL-MCNC: 35.9 MG/DL
HGB BLD-MCNC: 17.5 G/DL (ref 12–16)
LDLC SERPL CALC-MCNC: 95 MG/DL
MCH RBC QN AUTO: 32.8 PG (ref 26–34)
MCHC RBC AUTO-ENTMCNC: 34.2 G/DL (ref 32–36)
MCV RBC AUTO: 96 FL (ref 80–100)
NON HDL CHOLESTEROL: 113 MG/DL (ref 0–149)
NRBC BLD-RTO: 0 /100 WBCS (ref 0–0)
PLATELET # BLD AUTO: 235 X10*3/UL (ref 150–450)
POTASSIUM SERPL-SCNC: 4.5 MMOL/L (ref 3.5–5.3)
PROT SERPL-MCNC: 7.2 G/DL (ref 6.4–8.2)
RBC # BLD AUTO: 5.34 X10*6/UL (ref 4–5.2)
SODIUM SERPL-SCNC: 140 MMOL/L (ref 136–145)
TRIGL SERPL-MCNC: 92 MG/DL (ref 0–149)
VIT B12 SERPL-MCNC: 449 PG/ML (ref 211–911)
VLDL: 18 MG/DL (ref 0–40)
WBC # BLD AUTO: 10.3 X10*3/UL (ref 4.4–11.3)

## 2024-06-07 PROCEDURE — 99396 PREV VISIT EST AGE 40-64: CPT | Performed by: FAMILY MEDICINE

## 2024-06-07 PROCEDURE — 82607 VITAMIN B-12: CPT

## 2024-06-07 PROCEDURE — 82306 VITAMIN D 25 HYDROXY: CPT

## 2024-06-07 PROCEDURE — 86803 HEPATITIS C AB TEST: CPT

## 2024-06-07 PROCEDURE — 80061 LIPID PANEL: CPT

## 2024-06-07 PROCEDURE — 80053 COMPREHEN METABOLIC PANEL: CPT

## 2024-06-07 PROCEDURE — 85027 COMPLETE CBC AUTOMATED: CPT

## 2024-06-07 PROCEDURE — 36415 COLL VENOUS BLD VENIPUNCTURE: CPT

## 2024-06-07 RX ORDER — ATORVASTATIN CALCIUM 40 MG/1
40 TABLET, FILM COATED ORAL NIGHTLY
Qty: 90 TABLET | Refills: 1 | Status: SHIPPED | OUTPATIENT
Start: 2024-06-07

## 2024-06-07 RX ORDER — MOMETASONE FUROATE 1 MG/G
CREAM TOPICAL
Qty: 45 G | Refills: 1 | Status: SHIPPED | OUTPATIENT
Start: 2024-06-07

## 2024-06-07 NOTE — PROGRESS NOTES
"Subjective   Patient ID: Nery Devi is a 53 y.o. female who presents for Annual Exam (Still having problems with dry, itchy, cracking skin on her hands, and Elocon cream not working. /Patient reported health Good//Regular Dental Visits yes//Regular Eye Visits yes//Hearing Loss no//Balanced Diet yes//Weight Concerns yes//Exercise None///).  HPI  Patient comes in today for physical exam for work.  She is doing well overall.  Her biggest complaint is some dryness, cracking and itchy tips of the fingers of her right hand in particular.  She was given Elocon cream for the last visit and it seemed to help but temporarily.  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  She cannot wear gloves there because her hands sweat and the rash gets worse.    Patient also needs a refill of her amitriptyline.  Dr. Vallejo her neurologist has retired and he was prescribing it for her.    Patient's PHQ-9 score is 4 today which she describes as not difficult at all.    Review of Systems   All other systems reviewed and are negative.      Objective   Vitals:  /84   Pulse 89   Temp 36.6 °C (97.8 °F)   Resp 20   Ht 1.702 m (5' 7\")   Wt 118 kg (260 lb)   LMP  (LMP Unknown)   SpO2 94%   BMI 40.72 kg/m²     Physical Exam  Vitals reviewed.   Constitutional:       Appearance: She is well-developed. She is morbidly obese.   HENT:      Head: Normocephalic and atraumatic.      Right Ear: Tympanic membrane, ear canal and external ear normal.      Left Ear: Tympanic membrane, ear canal and external ear normal.      Nose: Nose normal.      Mouth/Throat:      Mouth: Mucous membranes are moist.      Pharynx: Oropharynx is clear.   Eyes:      Extraocular Movements: Extraocular movements intact.      Conjunctiva/sclera: Conjunctivae normal.      Pupils: Pupils are equal, round, and reactive to light.   Cardiovascular:      Rate and Rhythm: Normal rate and regular rhythm.      Heart sounds: Normal heart sounds. " No murmur heard.  Pulmonary:      Effort: Pulmonary effort is normal.      Breath sounds: Normal breath sounds. No wheezing.   Abdominal:      General: Abdomen is flat. Bowel sounds are normal.      Palpations: Abdomen is soft.   Musculoskeletal:         General: Normal range of motion.   Skin:     General: Skin is warm and dry.      Findings: Rash (Rash of the distal tips of the ring, middle and index finger of the right hand) present.   Neurological:      General: No focal deficit present.      Mental Status: She is alert and oriented to person, place, and time. Mental status is at baseline.   Psychiatric:         Mood and Affect: Mood normal.         Behavior: Behavior normal.         Thought Content: Thought content normal.         Judgment: Judgment normal.       Assessment/Plan   Problem List Items Addressed This Visit       Vitamin D deficiency    Relevant Orders    Vitamin D 25-Hydroxy,Total (for eval of Vitamin D levels) (Completed)    Hyperlipidemia    Relevant Medications    atorvastatin (Lipitor) 40 mg tablet    Other Relevant Orders    Lipid Panel (Completed)    Vitamin B12 deficiency - Primary    Relevant Orders    CBC (Completed)    Vitamin B12 (Completed)    Dermatitis    Relevant Medications    mometasone (Elocon) 0.1 % cream     Other Visit Diagnoses       Encounter for health maintenance examination        Relevant Orders    Comprehensive Metabolic Panel (Completed)    Encounter for hepatitis C screening test for low risk patient        Relevant Orders    Hepatitis C Antibody (Completed)    Encounter for screening mammogram for breast cancer        Relevant Orders    BI mammo bilateral screening tomosynthesis        Will contact patient with lab results when available.  Will contact patient with mammogram results when available.  Patient encouraged to do Cologuard test  Wellness form will be completed with lab values once obtained and sent to patient's workplace. A copy of the form will be made  and placed in the patient's chart.  Medication list reconciled.  Thank you for visiting today!      Professional services: Some of this note was completed using Dragon voice technology and sometimes the software misinterprets words. This may include unintended errors with respect to translation of words, typographical errors or grammar errors which may not have been identified prior to finalization of the chart note. Please take this into account when reading the note. Thank you.               Phil Millan DO 06/08/24 1:41 PM

## 2024-06-10 NOTE — RESULT ENCOUNTER NOTE
Please notify patient of slightly high blood sugar of 109.  Recommend watching sugar, carbs and starches in the diet.    Hemoglobin is high, will need to recheck this again in October.    Vitamin D level was low normal at 35.  It should be between 30 and 100 the closer to 50 the better. It is an important vitamin for your heart, lungs, immune system and bones among other things in your body. Would recommend over the counter vitamin D3 2000 units daily to get it into and keep it in the normal range and recheck in October 2024.     All the rest of the labs were good recheck again in a year.

## 2024-06-12 ENCOUNTER — TELEPHONE (OUTPATIENT)
Dept: PRIMARY CARE | Facility: CLINIC | Age: 54
End: 2024-06-12
Payer: COMMERCIAL

## 2024-06-12 NOTE — TELEPHONE ENCOUNTER
----- Message from Phil Millan DO sent at 6/10/2024  7:58 AM EDT -----  Please notify patient of slightly high blood sugar of 109.  Recommend watching sugar, carbs and starches in the diet.    Hemoglobin is high, will need to recheck this again in October.    Vitamin D level was low normal at 35.  It should be between 30 and 100 the closer to 50 the better. It is an important vitamin for your heart, lungs, immune system and bones among other things in your body. Would recommend over the counter vitamin D3 2000 units daily to get it into and keep it in the normal range and recheck in October 2024.     All the rest of the labs were good recheck again in a year.

## 2024-08-29 ENCOUNTER — OFFICE VISIT (OUTPATIENT)
Dept: PRIMARY CARE | Facility: CLINIC | Age: 54
End: 2024-08-29
Payer: COMMERCIAL

## 2024-08-29 VITALS
HEART RATE: 85 BPM | WEIGHT: 264 LBS | OXYGEN SATURATION: 95 % | DIASTOLIC BLOOD PRESSURE: 86 MMHG | RESPIRATION RATE: 20 BRPM | BODY MASS INDEX: 41.35 KG/M2 | SYSTOLIC BLOOD PRESSURE: 122 MMHG | TEMPERATURE: 98.2 F

## 2024-08-29 DIAGNOSIS — J01.80 ACUTE NON-RECURRENT SINUSITIS OF OTHER SINUS: Primary | ICD-10-CM

## 2024-08-29 LAB — POC SARS-COV-2 AG BINAX: NORMAL

## 2024-08-29 PROCEDURE — 99213 OFFICE O/P EST LOW 20 MIN: CPT | Performed by: FAMILY MEDICINE

## 2024-08-29 PROCEDURE — 87811 SARS-COV-2 COVID19 W/OPTIC: CPT | Performed by: FAMILY MEDICINE

## 2024-08-29 RX ORDER — AZITHROMYCIN 250 MG/1
TABLET, FILM COATED ORAL
Qty: 6 TABLET | Refills: 0 | Status: SHIPPED | OUTPATIENT
Start: 2024-08-29 | End: 2024-08-29

## 2024-08-29 RX ORDER — AZITHROMYCIN 250 MG/1
TABLET, FILM COATED ORAL
Qty: 6 TABLET | Refills: 0 | Status: SHIPPED | OUTPATIENT
Start: 2024-08-29 | End: 2024-09-03

## 2024-08-29 ASSESSMENT — PATIENT HEALTH QUESTIONNAIRE - PHQ9
2. FEELING DOWN, DEPRESSED OR HOPELESS: NOT AT ALL
SUM OF ALL RESPONSES TO PHQ9 QUESTIONS 1 AND 2: 0
1. LITTLE INTEREST OR PLEASURE IN DOING THINGS: NOT AT ALL

## 2024-08-29 ASSESSMENT — ENCOUNTER SYMPTOMS
LOSS OF SENSATION IN FEET: 0
OCCASIONAL FEELINGS OF UNSTEADINESS: 0
DEPRESSION: 0

## 2024-08-29 NOTE — PROGRESS NOTES
Subjective   Patient ID: Nery Devi is a 54 y.o. female who presents for Sinus Problem (Pressure and drainage since Sunday. Nausea. Fever this morning.  She cannot blow any mucus out of her nose. ).    HPI  Patient comes in today complaining of sinus congestion, pressure, low-grade fever and chills since Sunday.  She did not test for COVID.  She denies sore throat.  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  Her  and son have not been sick.  She has a graduation party this weekend for her son who is going into the [employer].    Review of Systems   All other systems reviewed and are negative.      Objective   Vitals:  /86   Pulse 85   Temp 36.8 °C (98.2 °F)   Resp 20   Wt 120 kg (264 lb)   LMP  (LMP Unknown)   SpO2 95%   BMI 41.35 kg/m²     Physical Exam  Vitals reviewed.   Constitutional:       Appearance: She is well-developed. She is morbidly obese.   HENT:      Head: Normocephalic and atraumatic.      Right Ear: Tympanic membrane, ear canal and external ear normal.      Left Ear: Tympanic membrane, ear canal and external ear normal.      Nose: Congestion and rhinorrhea present.      Mouth/Throat:      Mouth: Mucous membranes are moist.      Pharynx: Oropharynx is clear.   Eyes:      Extraocular Movements: Extraocular movements intact.      Conjunctiva/sclera: Conjunctivae normal.      Pupils: Pupils are equal, round, and reactive to light.   Cardiovascular:      Rate and Rhythm: Normal rate and regular rhythm.      Heart sounds: Normal heart sounds. No murmur heard.  Pulmonary:      Effort: Pulmonary effort is normal.      Breath sounds: Normal breath sounds. No wheezing.   Abdominal:      General: Abdomen is flat. Bowel sounds are normal.      Palpations: Abdomen is soft.   Musculoskeletal:         General: Normal range of motion.   Skin:     General: Skin is warm and dry.      Comments: Patient has multiple ear piercings and tattoos   Neurological:      General: No  focal deficit present.      Mental Status: She is alert and oriented to person, place, and time. Mental status is at baseline.   Psychiatric:         Mood and Affect: Mood normal.         Behavior: Behavior normal.         Thought Content: Thought content normal.         Judgment: Judgment normal.         Assessment/Plan   Problem List Items Addressed This Visit    None  Visit Diagnoses       Acute non-recurrent sinusitis of other sinus    -  Primary    Relevant Medications    azithromycin (Zithromax) 250 mg tablet    Other Relevant Orders    POCT BinaxNOW Covid-19 Ag Card manually resulted (Completed)        Use Allegra-D 180 mg 1 every 24 hours  Use meds as directed.  Return to clinic if symptoms do not improve in 10-14 days.    Should the condition worsen contact me and return here or if after hours seek further evaluation at an urgent care or in the emergency room.  Medication list reconciled.  Thank you for visiting today!      Professional services: Some of this note was completed using Dragon voice technology and sometimes the software misinterprets words. This may include unintended errors with respect to translation of words, typographical errors or grammar errors which may not have been identified prior to finalization of the chart note. Please take this into account when reading the note. Thank you.       Phil Millan DO 08/31/24 10:20 AM

## 2024-09-05 ENCOUNTER — TELEPHONE (OUTPATIENT)
Dept: PRIMARY CARE | Facility: CLINIC | Age: 54
End: 2024-09-05
Payer: COMMERCIAL

## 2024-09-05 DIAGNOSIS — J01.80 ACUTE NON-RECURRENT SINUSITIS OF OTHER SINUS: Primary | ICD-10-CM

## 2024-09-05 RX ORDER — AMOXICILLIN AND CLAVULANATE POTASSIUM 875; 125 MG/1; MG/1
875 TABLET, FILM COATED ORAL 2 TIMES DAILY
Qty: 14 TABLET | Refills: 0 | Status: SHIPPED | OUTPATIENT
Start: 2024-09-05 | End: 2024-09-12

## 2024-09-05 NOTE — TELEPHONE ENCOUNTER
Please notify patient that a prescription for new antibiotic was sent to her [facility] pharmacy at [facility].  Thank you.

## 2024-09-05 NOTE — TELEPHONE ENCOUNTER
Patient states that she was in last week for a sinus infection and finished her antibiotic on Tuesday, but she states that it is still not clearing up and she is asking if a prescription can be sent to her pharmacy?  Patient can be reached at [phone]      Thank You

## 2025-02-07 ENCOUNTER — OFFICE VISIT (OUTPATIENT)
Dept: PRIMARY CARE | Facility: CLINIC | Age: 55
End: 2025-02-07
Payer: COMMERCIAL

## 2025-02-07 VITALS
SYSTOLIC BLOOD PRESSURE: 131 MMHG | WEIGHT: 273 LBS | BODY MASS INDEX: 42.76 KG/M2 | RESPIRATION RATE: 20 BRPM | TEMPERATURE: 98.1 F | DIASTOLIC BLOOD PRESSURE: 87 MMHG | HEART RATE: 87 BPM

## 2025-02-07 DIAGNOSIS — J20.9 ACUTE BRONCHITIS, UNSPECIFIED ORGANISM: Primary | ICD-10-CM

## 2025-02-07 DIAGNOSIS — E66.01 MORBID OBESITY WITH BODY MASS INDEX (BMI) OF 40.0 TO 44.9 IN ADULT (MULTI): ICD-10-CM

## 2025-02-07 PROCEDURE — 99214 OFFICE O/P EST MOD 30 MIN: CPT | Performed by: FAMILY MEDICINE

## 2025-02-07 RX ORDER — SEMAGLUTIDE 0.25 MG/.5ML
0.25 INJECTION, SOLUTION SUBCUTANEOUS
Qty: 2 ML | Refills: 0 | Status: SHIPPED | OUTPATIENT
Start: 2025-02-09 | End: 2025-03-03

## 2025-02-07 RX ORDER — DOXYCYCLINE 100 MG/1
100 CAPSULE ORAL 2 TIMES DAILY
Qty: 20 CAPSULE | Refills: 0 | Status: SHIPPED | OUTPATIENT
Start: 2025-02-07

## 2025-02-07 ASSESSMENT — ENCOUNTER SYMPTOMS
DEPRESSION: 0
LOSS OF SENSATION IN FEET: 0
OCCASIONAL FEELINGS OF UNSTEADINESS: 0

## 2025-02-07 NOTE — PROGRESS NOTES
Subjective   Patient ID: Nery Devi is a 54 y.o. female who presents for Sinus Problem (Sick for over 1 week. Started into the sinus this week. Covid negative Wednesday. Sinus congestion and pressure. Cannot get much mucus out but green, coughing a bit from drainage.).    HPI  Patient comes in today complaining of being sick for the last 1 to 2 weeks.  This started out his cold symptoms and have gradually gotten worse.  This past Wednesday she could barely get out of bed.  She states her head feels blocked and her cough is productive of brown sputum.  She is a smoker of a pack a day.  She has no desire to quit at this time.  She did test for COVID on Wednesday and it was negative.    Patient is interested in medication for weight loss.  She states in the past she has tried the keto diet and they work but she has regained the weight.  She was hoping to try the semaglutide medications that are highly advertised today.    Review of Systems   All other systems reviewed and are negative.      Objective   Vitals:  /87   Pulse 87   Temp 36.7 °C (98.1 °F)   Resp 20   Wt 124 kg (273 lb)   LMP  (LMP Unknown)   BMI 42.76 kg/m²     Physical Exam  Vitals reviewed.   Constitutional:       Appearance: She is well-developed. She is morbidly obese.   HENT:      Head: Normocephalic and atraumatic.      Right Ear: Tympanic membrane, ear canal and external ear normal.      Left Ear: Tympanic membrane, ear canal and external ear normal.      Nose: Congestion and rhinorrhea present.      Mouth/Throat:      Mouth: Mucous membranes are moist.      Pharynx: Oropharynx is clear.   Eyes:      Extraocular Movements: Extraocular movements intact.      Conjunctiva/sclera: Conjunctivae normal.      Pupils: Pupils are equal, round, and reactive to light.   Cardiovascular:      Rate and Rhythm: Normal rate and regular rhythm.      Heart sounds: Normal heart sounds. No murmur heard.  Pulmonary:      Effort: Pulmonary effort is  normal.      Breath sounds: Rhonchi (Mild scattered rhonchi bilateral lung fields without wheeze) present. No wheezing.   Abdominal:      General: Abdomen is flat. Bowel sounds are normal.      Palpations: Abdomen is soft.   Musculoskeletal:         General: Normal range of motion.   Skin:     General: Skin is warm and dry.      Comments: Patient has multiple ear piercings and tattoos   Neurological:      General: No focal deficit present.      Mental Status: She is alert and oriented to person, place, and time. Mental status is at baseline.   Psychiatric:         Mood and Affect: Mood normal.         Behavior: Behavior normal.         Thought Content: Thought content normal.         Judgment: Judgment normal.       Assessment/Plan   Problem List Items Addressed This Visit       Morbid obesity with body mass index (BMI) of 40.0 to 44.9 in adult (Multi)    Relevant Medications    semaglutide, weight loss, (Wegovy) 0.25 mg/0.5 mL pen injector (Start on 2/9/2025)     Other Visit Diagnoses       Acute bronchitis, unspecified organism    -  Primary    Relevant Medications    doxycycline (Monodox) 100 mg capsule        Use Mucinex DM 1 every 12 hrs as needed for cough  Behavioral counseling for weight loss done today.  I would recommend that you track your calories daily for the next 2 weeks.  If you have Internet access, you may want to try myfitness [email or web address] to track your caloric intake and calories burned each day.  It is FREE to join.  Afterward, there are 3500 sixto in a pound of fat so try and restrict your caloric intake to 500 sixto less than your average over the 2-week period of time.  Example: If you consumed an average of 2000 sixto a day during those 2 weeks then restrict your calorie intake to no more than 1500 calories a day.  By doing so, 500 sixto x 7 days a week equals 3500 sixto and thus you should lose at least a pound a week.      Also exercise is extremely important.  Try to exercise at least 3-5 times a week  for 30 minutes each.  Ultimate goal would be to try to achieve 10,000 steps a day.    Try to drink 32-64 ounces of water a day.    Return to clinic in one month for recheck, sooner should problems arise  Patient to return to clinic 1 month after starting weight loss medication  Medication list reconciled.  Thank you for visiting today!      Professional services: Some of this note was completed using Dragon voice technology and sometimes the software misinterprets words. This may include unintended errors with respect to translation of words, typographical errors or grammar errors which may not have been identified prior to finalization of the chart note. Please take this into account when reading the note. Thank you.       Phil Millan DO 02/07/25 4:29 PM

## 2025-02-14 ENCOUNTER — TELEPHONE (OUTPATIENT)
Dept: PRIMARY CARE | Facility: CLINIC | Age: 55
End: 2025-02-14
Payer: COMMERCIAL

## 2025-02-14 DIAGNOSIS — E66.01 MORBID OBESITY (MULTI): Primary | ICD-10-CM

## 2025-02-14 DIAGNOSIS — E66.01 MORBID OBESITY WITH BMI OF 40.0-44.9, ADULT (MULTI): ICD-10-CM

## 2025-02-14 NOTE — TELEPHONE ENCOUNTER
Patient called in this morning and stated that the Wegovy was going to cost her over 1300.00 out of pocket.  Patient is asking what she should do next?  Patient can be reached at [phone].      Thank You

## 2025-02-14 NOTE — TELEPHONE ENCOUNTER
Please notify patient that I have put in a referral to clinical pharmacy, [facility] who will contact her regarding other medication options for weight loss with her insurance.  Thank you.

## 2025-03-07 DIAGNOSIS — R51.9 CHRONIC INTRACTABLE HEADACHE, UNSPECIFIED HEADACHE TYPE: ICD-10-CM

## 2025-03-07 DIAGNOSIS — G89.29 CHRONIC INTRACTABLE HEADACHE, UNSPECIFIED HEADACHE TYPE: ICD-10-CM

## 2025-03-07 RX ORDER — AMITRIPTYLINE HYDROCHLORIDE 50 MG/1
50 TABLET, FILM COATED ORAL NIGHTLY
Qty: 90 TABLET | Refills: 1 | OUTPATIENT
Start: 2025-03-07 | End: 2025-09-03

## 2025-03-07 NOTE — TELEPHONE ENCOUNTER
Rx Refill Request Telephone Encounter    Name:  Nery Devi  :  [id number]  Medication Name:  amitriptyline (Elavil) 50 mg tablet     Specific Pharmacy location:    [facility] - [address] Phone: [phone]   Fax: [phone]        Date of last appointment:  25  Date of next appointment:  25  Best number to reach patient:  [phone]          Thank You

## 2025-03-07 NOTE — TELEPHONE ENCOUNTER
Pt states she used to get this medication from her neurologist for her headaches, but he has retired and requests that PCP take over filling this medication.     Requested Prescriptions     Pending Prescriptions Disp Refills    amitriptyline (Elavil) 50 mg tablet 90 tablet 1     Sig: Take 1 tablet (50 mg) by mouth once daily at bedtime.

## 2025-03-07 NOTE — TELEPHONE ENCOUNTER
Has not been filled I what looks like 10 years. LMTCB regarding this request and what she needs it for (possibly insomnia?)

## 2025-03-18 ENCOUNTER — APPOINTMENT (OUTPATIENT)
Dept: PHARMACY | Facility: HOSPITAL | Age: 55
End: 2025-03-18
Payer: COMMERCIAL

## 2025-03-18 DIAGNOSIS — E66.01 MORBID OBESITY WITH BMI OF 40.0-44.9, ADULT (MULTI): ICD-10-CM

## 2025-03-19 NOTE — PROGRESS NOTES
Subjective   Patient ID: Nery Devi is a 54 y.o. female who presents for No chief complaint on file..    Referring Provider: Phil Millan DO     HPI    Review of Systems    Objective     There were no vitals taken for this visit.     Labs  Lab Results   Component Value Date    BILITOT 0.5 06/07/2024    CALCIUM 9.7 06/07/2024    CO2 25 06/07/2024     06/07/2024    CREATININE 0.88 06/07/2024    GLUCOSE 109 (H) 06/07/2024    ALKPHOS 95 06/07/2024    K 4.5 06/07/2024    PROT 7.2 06/07/2024     06/07/2024    AST 21 06/07/2024    ALT 29 06/07/2024    BUN 15 06/07/2024    ANIONGAP 14 06/07/2024    ALBUMIN 4.6 06/07/2024    GFRF 83 05/26/2023     Lab Results   Component Value Date    TRIG 92 06/07/2024    CHOL 149 06/07/2024    LDLCALC 95 06/07/2024    HDL 35.9 06/07/2024     Lab Results   Component Value Date    HGBA1C 5.5 05/26/2023       Assessment/Plan   Patient was referred for weight loss and management with a GLP-1. Will submit a PA and will follow up in 1 week.    PLAN:  1. Follow up in 1 week    Continue all meds under the continuation of care with the referring provider and clinical pharmacy team.    Dahlia Chun, PharmD

## 2025-03-25 ENCOUNTER — APPOINTMENT (OUTPATIENT)
Dept: PHARMACY | Facility: HOSPITAL | Age: 55
End: 2025-03-25
Payer: COMMERCIAL

## 2025-03-25 DIAGNOSIS — E66.01 MORBID OBESITY WITH BMI OF 40.0-44.9, ADULT (MULTI): ICD-10-CM

## 2025-03-27 NOTE — PROGRESS NOTES
Subjective   Patient ID: Nery Devi is a 54 y.o. female who presents for No chief complaint on file..    Referring Provider: Phil Millan DO     HPI    Review of Systems    Objective     There were no vitals taken for this visit.     Labs  Lab Results   Component Value Date    BILITOT 0.5 06/07/2024    CALCIUM 9.7 06/07/2024    CO2 25 06/07/2024     06/07/2024    CREATININE 0.88 06/07/2024    GLUCOSE 109 (H) 06/07/2024    ALKPHOS 95 06/07/2024    K 4.5 06/07/2024    PROT 7.2 06/07/2024     06/07/2024    AST 21 06/07/2024    ALT 29 06/07/2024    BUN 15 06/07/2024    ANIONGAP 14 06/07/2024    ALBUMIN 4.6 06/07/2024    GFRF 83 05/26/2023     Lab Results   Component Value Date    TRIG 92 06/07/2024    CHOL 149 06/07/2024    LDLCALC 95 06/07/2024    HDL 35.9 06/07/2024     Lab Results   Component Value Date    HGBA1C 5.5 05/26/2023       Assessment/Plan   Patient PA for Wegovy was denied as it is not on their drug formulary. Discussed alternative options and she would like to consider Adipex. Patient has an upcoming appointment in April, and will discuss further with PCP. Will follow up as needed.    PLAN:  1. Follow up as needed    Continue all meds under the continuation of care with the referring provider and clinical pharmacy team.    Dahlia Chun, PharmD

## 2025-04-18 ENCOUNTER — APPOINTMENT (OUTPATIENT)
Dept: RADIOLOGY | Facility: CLINIC | Age: 55
End: 2025-04-18
Payer: COMMERCIAL

## 2025-04-18 ENCOUNTER — APPOINTMENT (OUTPATIENT)
Dept: PRIMARY CARE | Facility: CLINIC | Age: 55
End: 2025-04-18
Payer: COMMERCIAL

## 2025-04-18 VITALS
DIASTOLIC BLOOD PRESSURE: 85 MMHG | TEMPERATURE: 97.8 F | HEART RATE: 80 BPM | RESPIRATION RATE: 20 BRPM | BODY MASS INDEX: 43.76 KG/M2 | HEIGHT: 67 IN | SYSTOLIC BLOOD PRESSURE: 128 MMHG | WEIGHT: 278.8 LBS

## 2025-04-18 DIAGNOSIS — Z13.228 SCREENING FOR METABOLIC DISORDER: ICD-10-CM

## 2025-04-18 DIAGNOSIS — E55.9 VITAMIN D DEFICIENCY: ICD-10-CM

## 2025-04-18 DIAGNOSIS — E66.01 MORBID OBESITY WITH BODY MASS INDEX (BMI) OF 40.0 TO 44.9 IN ADULT (MULTI): ICD-10-CM

## 2025-04-18 DIAGNOSIS — E78.5 HYPERLIPIDEMIA, UNSPECIFIED HYPERLIPIDEMIA TYPE: ICD-10-CM

## 2025-04-18 DIAGNOSIS — Z79.899 MEDICATION MANAGEMENT: ICD-10-CM

## 2025-04-18 DIAGNOSIS — L30.9 DERMATITIS: ICD-10-CM

## 2025-04-18 DIAGNOSIS — R51.9 CHRONIC INTRACTABLE HEADACHE, UNSPECIFIED HEADACHE TYPE: ICD-10-CM

## 2025-04-18 DIAGNOSIS — Z78.0 POST-MENOPAUSAL: ICD-10-CM

## 2025-04-18 DIAGNOSIS — E53.8 VITAMIN B12 DEFICIENCY: ICD-10-CM

## 2025-04-18 DIAGNOSIS — Z00.00 ROUTINE GENERAL MEDICAL EXAMINATION AT A HEALTH CARE FACILITY: Primary | ICD-10-CM

## 2025-04-18 DIAGNOSIS — Z12.31 ENCOUNTER FOR SCREENING MAMMOGRAM FOR BREAST CANCER: ICD-10-CM

## 2025-04-18 DIAGNOSIS — G89.29 CHRONIC INTRACTABLE HEADACHE, UNSPECIFIED HEADACHE TYPE: ICD-10-CM

## 2025-04-18 PROCEDURE — 99396 PREV VISIT EST AGE 40-64: CPT | Performed by: FAMILY MEDICINE

## 2025-04-18 RX ORDER — MOMETASONE FUROATE 1 MG/G
CREAM TOPICAL
Qty: 45 G | Refills: 1 | Status: SHIPPED | OUTPATIENT
Start: 2025-04-18

## 2025-04-18 RX ORDER — ATORVASTATIN CALCIUM 40 MG/1
40 TABLET, FILM COATED ORAL NIGHTLY
Qty: 90 TABLET | Refills: 3 | Status: SHIPPED | OUTPATIENT
Start: 2025-04-18

## 2025-04-18 RX ORDER — AMITRIPTYLINE HYDROCHLORIDE 50 MG/1
50 TABLET, FILM COATED ORAL NIGHTLY
Qty: 90 TABLET | Refills: 1 | Status: SHIPPED | OUTPATIENT
Start: 2025-04-18 | End: 2025-10-15

## 2025-04-18 NOTE — PROGRESS NOTES
"Subjective   Patient ID: Nery Devi is a 54 y.o. female who presents for Annual Exam.    HPI  Patient comes in today for annual physical exam.  She is taking all her medicines as directed without any side effects.  Her PHQ-9 score today is 5 which she describes as somewhat difficult.  She is frustrated because of her weight.  We have tried to get Wegovy approved for her but it is not covered by her insurance.  We discussed other options today and she would like to try Adipex.  CSA and RUDS done today.    Cologuard test was ordered for patient last year but she still has the box at home and has not done it.    Patient has upcoming mammogram scheduled will add DEXA scan.    Patient's PHQ-9 score is 5 which she describes as somewhat difficult.  It again relates to her weight frustrations.    Review of Systems   All other systems reviewed and are negative.      Objective   Vitals:  /85   Pulse 80   Temp 36.6 °C (97.8 °F)   Resp 20   Ht 1.702 m (5' 7\")   Wt 126 kg (278 lb 12.8 oz)   BMI 43.67 kg/m²     Physical Exam  Vitals reviewed.   Constitutional:       Appearance: She is well-developed. She is morbidly obese.   HENT:      Head: Normocephalic and atraumatic.      Right Ear: Tympanic membrane, ear canal and external ear normal.      Left Ear: Tympanic membrane, ear canal and external ear normal.      Mouth/Throat:      Mouth: Mucous membranes are moist.      Pharynx: Oropharynx is clear.   Eyes:      Extraocular Movements: Extraocular movements intact.      Conjunctiva/sclera: Conjunctivae normal.      Pupils: Pupils are equal, round, and reactive to light.   Cardiovascular:      Rate and Rhythm: Normal rate and regular rhythm.      Heart sounds: Normal heart sounds. No murmur heard.  Pulmonary:      Effort: Pulmonary effort is normal.      Breath sounds: No wheezing.   Abdominal:      General: Abdomen is flat. Bowel sounds are normal.      Palpations: Abdomen is soft.   Musculoskeletal:         " General: Normal range of motion.   Skin:     General: Skin is warm and dry.      Comments: Patient has multiple ear piercings and tattoos   Neurological:      General: No focal deficit present.      Mental Status: She is alert and oriented to person, place, and time. Mental status is at baseline.   Psychiatric:         Mood and Affect: Mood normal.         Behavior: Behavior normal.         Thought Content: Thought content normal.         Judgment: Judgment normal.       CBC -  Recent Labs     04/18/25  0935 06/07/24  0932 05/26/23  0908   WBC 10.0 10.3 9.4   HGB 16.6* 17.5* 16.6*   HCT 47.9* 51.2* 50.3*    235 216   MCV 93.4 96 97       CMP -  Recent Labs     04/18/25  0935 06/07/24  0932 05/26/23  0908    140 139   K 4.3 4.5 4.4    106 106   CO2 26 25 27   ANIONGAP 8 14 10   BUN 10 15 12   CREATININE 0.67 0.88 0.84   EGFR 103 78  --      Recent Labs     04/18/25  0935 06/07/24  0932 05/26/23  0908   ALBUMIN 4.2 4.6 4.2   ALKPHOS 87 95 76   ALT 18 29 27   AST 17 21 18   BILITOT 0.6 0.5 0.5       LIPID PANEL -   Recent Labs     04/18/25  0935 06/07/24  0932 05/26/23  0908 04/29/22  1046 11/05/21  1126   CHOL 139 149 133 110 131   LDLCALC 85 95  --   --   --    LDLF  --   --  87 66 78   HDL 36* 35.9 35.3* 29.0* 29.0*   TRIG 86 92 55 75 119       Recent Labs     05/26/23  0908 09/06/19  1112 06/22/18  1010   HGBA1C 5.5 5.8 5.8       Lab Results   Component Value Date    XIWYCASX67 403 04/18/2025       Lab Results   Component Value Date    VITD25 41 04/18/2025        Assessment/Plan   Problem List Items Addressed This Visit       Encounter for screening mammogram for breast cancer    Relevant Orders    BI mammo bilateral screening tomosynthesis    Vitamin D deficiency    Relevant Orders    Vitamin D 25-Hydroxy,Total (for eval of Vitamin D levels) (Completed)    Hyperlipidemia    Relevant Medications    atorvastatin (Lipitor) 40 mg tablet    Other Relevant Orders    Lipid Panel (Completed)    Vitamin  B12 deficiency    Relevant Orders    CBC (Completed)    Vitamin B12 (Completed)    Dermatitis    Relevant Medications    mometasone (Elocon) 0.1 % cream    Morbid obesity with body mass index (BMI) of 40.0 to 44.9 in adult (Multi)    Overview   Start phentermine wt 278    4/18/25         Post-menopausal    Relevant Orders    XR DEXA bone density    Medication management    Relevant Orders    Drug Screen, Urine With Reflex to Confirmation (Completed)    Chronic intractable headache    Relevant Medications    amitriptyline (Elavil) 50 mg tablet     Other Visit Diagnoses         Routine general medical examination at a health care facility    -  Primary        Wellness form will be completed with lab values once obtained and sent to patient's workplace. A copy of the form will be made and placed in the patient's chart.  Will contact patient with lab results when available.  Start Adipex  RTC in 3 and half weeks for recheck weight  Medication list reconciled.  Thank you for visiting today!      Professional services: Some of this note was completed using Dragon voice technology and sometimes the software misinterprets words. This may include unintended errors with respect to translation of words, typographical errors or grammar errors which may not have been identified prior to finalization of the chart note. Please take this into account when reading the note. Thank you.

## 2025-04-19 PROBLEM — G89.29 CHRONIC INTRACTABLE HEADACHE: Status: ACTIVE | Noted: 2025-04-19

## 2025-04-19 PROBLEM — R51.9 CHRONIC INTRACTABLE HEADACHE: Status: ACTIVE | Noted: 2025-04-19

## 2025-04-19 PROBLEM — Z78.0 POST-MENOPAUSAL: Status: ACTIVE | Noted: 2025-04-19

## 2025-04-19 PROBLEM — Z79.899 MEDICATION MANAGEMENT: Status: ACTIVE | Noted: 2025-04-19

## 2025-04-19 PROBLEM — Z12.31 ENCOUNTER FOR SCREENING MAMMOGRAM FOR BREAST CANCER: Status: ACTIVE | Noted: 2023-05-26

## 2025-04-19 PROBLEM — Z13.228 SCREENING FOR METABOLIC DISORDER: Status: ACTIVE | Noted: 2023-05-26

## 2025-04-19 LAB
25(OH)D3+25(OH)D2 SERPL-MCNC: 41 NG/ML (ref 30–100)
ALBUMIN SERPL-MCNC: 4.2 G/DL (ref 3.6–5.1)
ALP SERPL-CCNC: 87 U/L (ref 37–153)
ALT SERPL-CCNC: 18 U/L (ref 6–29)
AMPHETAMINES UR QL: NEGATIVE NG/ML
ANION GAP SERPL CALCULATED.4IONS-SCNC: 8 MMOL/L (CALC) (ref 7–17)
AST SERPL-CCNC: 17 U/L (ref 10–35)
BARBITURATES UR QL: NEGATIVE NG/ML
BENZODIAZ UR QL: NEGATIVE NG/ML
BILIRUB SERPL-MCNC: 0.6 MG/DL (ref 0.2–1.2)
BUN SERPL-MCNC: 10 MG/DL (ref 7–25)
BZE UR QL: NEGATIVE NG/ML
CALCIUM SERPL-MCNC: 9 MG/DL (ref 8.6–10.4)
CHLORIDE SERPL-SCNC: 105 MMOL/L (ref 98–110)
CHOLEST SERPL-MCNC: 139 MG/DL
CHOLEST/HDLC SERPL: 3.9 (CALC)
CO2 SERPL-SCNC: 26 MMOL/L (ref 20–32)
CREAT SERPL-MCNC: 0.67 MG/DL (ref 0.5–1.03)
CREAT UR-MCNC: 90.6 MG/DL
EGFRCR SERPLBLD CKD-EPI 2021: 103 ML/MIN/1.73M2
ERYTHROCYTE [DISTWIDTH] IN BLOOD BY AUTOMATED COUNT: 12.9 % (ref 11–15)
FENTANYL UR QL SCN: NEGATIVE NG/ML
GLUCOSE SERPL-MCNC: 90 MG/DL (ref 65–99)
HCT VFR BLD AUTO: 47.9 % (ref 35–45)
HDLC SERPL-MCNC: 36 MG/DL
HGB BLD-MCNC: 16.6 G/DL (ref 11.7–15.5)
LDLC SERPL CALC-MCNC: 85 MG/DL (CALC)
MCH RBC QN AUTO: 32.4 PG (ref 27–33)
MCHC RBC AUTO-ENTMCNC: 34.7 G/DL (ref 32–36)
MCV RBC AUTO: 93.4 FL (ref 80–100)
METHADONE UR QL: NEGATIVE NG/ML
NONHDLC SERPL-MCNC: 103 MG/DL (CALC)
OPIATES UR QL: NEGATIVE NG/ML
OXIDANTS UR QL: NEGATIVE MCG/ML
OXYCODONE UR QL: NEGATIVE NG/ML
PCP UR QL: NEGATIVE NG/ML
PH UR: 5.5 [PH] (ref 4.5–9)
PLATELET # BLD AUTO: 217 THOUSAND/UL (ref 140–400)
PMV BLD REES-ECKER: 10.3 FL (ref 7.5–12.5)
POTASSIUM SERPL-SCNC: 4.3 MMOL/L (ref 3.5–5.3)
PROT SERPL-MCNC: 6.8 G/DL (ref 6.1–8.1)
QUEST NOTES AND COMMENTS: NORMAL
RBC # BLD AUTO: 5.13 MILLION/UL (ref 3.8–5.1)
SODIUM SERPL-SCNC: 139 MMOL/L (ref 135–146)
THC UR QL: NEGATIVE NG/ML
TRIGL SERPL-MCNC: 86 MG/DL
VIT B12 SERPL-MCNC: 403 PG/ML (ref 200–1100)
WBC # BLD AUTO: 10 THOUSAND/UL (ref 3.8–10.8)

## 2025-04-21 DIAGNOSIS — E66.01 MORBID OBESITY WITH BMI OF 40.0-44.9, ADULT (MULTI): Primary | ICD-10-CM

## 2025-04-21 RX ORDER — PHENTERMINE HYDROCHLORIDE 37.5 MG/1
37.5 TABLET ORAL
Qty: 30 TABLET | Refills: 0 | Status: SHIPPED | OUTPATIENT
Start: 2025-04-21 | End: 2025-05-21

## 2025-04-22 ENCOUNTER — APPOINTMENT (OUTPATIENT)
Dept: RADIOLOGY | Facility: CLINIC | Age: 55
End: 2025-04-22
Payer: COMMERCIAL

## 2025-04-22 ENCOUNTER — HOSPITAL ENCOUNTER (OUTPATIENT)
Dept: RADIOLOGY | Facility: CLINIC | Age: 55
Discharge: HOME | End: 2025-04-22
Payer: COMMERCIAL

## 2025-04-22 DIAGNOSIS — Z12.31 ENCOUNTER FOR SCREENING MAMMOGRAM FOR BREAST CANCER: ICD-10-CM

## 2025-04-22 PROCEDURE — 77067 SCR MAMMO BI INCL CAD: CPT | Performed by: RADIOLOGY

## 2025-04-22 PROCEDURE — 77063 BREAST TOMOSYNTHESIS BI: CPT | Performed by: RADIOLOGY

## 2025-04-22 PROCEDURE — 77067 SCR MAMMO BI INCL CAD: CPT

## 2025-04-24 DIAGNOSIS — R92.8 ABNORMAL MAMMOGRAM: Primary | ICD-10-CM

## 2025-05-13 ENCOUNTER — APPOINTMENT (OUTPATIENT)
Dept: PRIMARY CARE | Facility: CLINIC | Age: 55
End: 2025-05-13
Payer: COMMERCIAL

## 2025-05-13 VITALS
TEMPERATURE: 98.4 F | RESPIRATION RATE: 20 BRPM | OXYGEN SATURATION: 95 % | WEIGHT: 277 LBS | SYSTOLIC BLOOD PRESSURE: 120 MMHG | BODY MASS INDEX: 43.38 KG/M2 | DIASTOLIC BLOOD PRESSURE: 85 MMHG | HEART RATE: 88 BPM

## 2025-05-13 DIAGNOSIS — E66.01 MORBID OBESITY WITH BMI OF 40.0-44.9, ADULT (MULTI): Primary | ICD-10-CM

## 2025-05-13 DIAGNOSIS — D22.9 MULTIPLE PIGMENTED NEVI: ICD-10-CM

## 2025-05-13 PROCEDURE — 99213 OFFICE O/P EST LOW 20 MIN: CPT | Performed by: FAMILY MEDICINE

## 2025-05-13 RX ORDER — PHENTERMINE HYDROCHLORIDE 37.5 MG/1
37.5 TABLET ORAL
Qty: 30 TABLET | Refills: 0 | Status: SHIPPED | OUTPATIENT
Start: 2025-05-13 | End: 2025-06-12

## 2025-05-13 NOTE — PROGRESS NOTES
Subjective   Patient ID: Nery Devi is a 54 y.o. female who presents for Follow-up (3 week fu for adipex. Concerns that she has not lost any weight, although she is eating less. Reports no side effects. ).    OARRS:  Phil Millan DO on 5/13/2025  2:26 PM  I have personally reviewed the OARRS report for Nery Devi. I have considered the risks of abuse, dependence, addiction and diversion and I believe that it is clinically appropriate for Nery Devi to be prescribed this medication    Is the patient prescribed a combination of a benzodiazepine and opioid?  No    Last Urine Drug Screen / ordered today: No  Recent Results (from the past 8760 hours)   Drug Screen, Urine With Reflex to Confirmation    Collection Time: 04/18/25  9:22 AM   Result Value Ref Range    Fentanyl NEGATIVE <0.5 ng/mL    Amphetamines NEGATIVE <500 ng/mL    Barbiturates NEGATIVE <300 ng/mL    Benzodiazepines NEGATIVE <100 ng/mL    Cocaine Metabolite NEGATIVE <150 ng/mL    Marijuana Metabolite NEGATIVE <20 ng/mL    Methadone Metabolite NEGATIVE <100 ng/mL    Opiates NEGATIVE <100 ng/mL    Oxycodone NEGATIVE <100 ng/mL    Phencyclidine NEGATIVE <25 ng/mL    Creatinine 90.6 > or = 20.0 mg/dL    pH 5.5 4.5 - 9.0    Oxidant NEGATIVE <200 mcg/mL    Notes and Comments       Results are as expected.         Controlled Substance Agreement:  Date of the Last Agreement:  4/18/25  Reviewed Controlled Substance Agreement including but not limited to the benefits, risks, and alternatives to treatment with a Controlled Substance medication(s).    Anorexiants:   What is the patient's goal of therapy?  To lose weight  Is this being achieved with current treatment?  Yes    Patient has demonstrated continued efforts to lose weight, is dedicated to the treatment program and the response to treatment. and I have assessed for the presence or absence of contraindications, adverse effects, and indicators of possible substance abuse that would  necessitate cessation of treatment utilizing controlled substance.    Activities of Daily Living:   Is your overall impression that this patient is benefiting (symptom reduction outweighs side effects) from anorexiants therapy? Yes     1. Physical Functioning: Better  2. Family Relationship: Better  3. Social Relationship: Better  4. Mood: Better  5. Sleep Patterns: Better  6. Overall Function: Better    HPI  Patient presents today for 1-month checkup and refill of meds.  She states that she is taking her medicines as directed and is not having any side effects from them. She currently is without complaints.  She has lost 1 pound since starting the medicine last month.  She does feel like it is curbing her appetite but she is frustrated because she has only lost a pound.    Patient also has some skin lesions that she would like removed.  She was referred to dermatology before but never made an appointment.    Review of Systems   All other systems reviewed and are negative.      Objective   Vitals:  /85   Pulse 88   Temp 36.9 °C (98.4 °F)   Resp 20   Wt 126 kg (277 lb)   LMP  (LMP Unknown)   SpO2 95%   BMI 43.38 kg/m²     Physical Exam  Vitals reviewed.   Constitutional:       Appearance: She is well-developed. She is morbidly obese.   HENT:      Head: Normocephalic and atraumatic.      Right Ear: Tympanic membrane, ear canal and external ear normal.      Left Ear: Tympanic membrane, ear canal and external ear normal.      Mouth/Throat:      Mouth: Mucous membranes are moist.      Pharynx: Oropharynx is clear.   Eyes:      Extraocular Movements: Extraocular movements intact.      Conjunctiva/sclera: Conjunctivae normal.      Pupils: Pupils are equal, round, and reactive to light.   Cardiovascular:      Rate and Rhythm: Normal rate and regular rhythm.      Heart sounds: Normal heart sounds. No murmur heard.  Pulmonary:      Effort: Pulmonary effort is normal.      Breath sounds: No wheezing.   Abdominal:       General: Abdomen is flat. Bowel sounds are normal.      Palpations: Abdomen is soft.   Musculoskeletal:         General: Normal range of motion.   Skin:     General: Skin is warm and dry.      Comments: Patient has multiple ear piercings and tattoos   Neurological:      General: No focal deficit present.      Mental Status: She is alert and oriented to person, place, and time. Mental status is at baseline.   Psychiatric:         Mood and Affect: Mood normal.         Behavior: Behavior normal.         Thought Content: Thought content normal.         Judgment: Judgment normal.         CBC -  Recent Labs     04/18/25  0935 06/07/24  0932 05/26/23  0908   WBC 10.0 10.3 9.4   HGB 16.6* 17.5* 16.6*   HCT 47.9* 51.2* 50.3*    235 216   MCV 93.4 96 97       CMP -  Recent Labs     04/18/25  0935 06/07/24  0932 05/26/23  0908    140 139   K 4.3 4.5 4.4    106 106   CO2 26 25 27   ANIONGAP 8 14 10   BUN 10 15 12   CREATININE 0.67 0.88 0.84   EGFR 103 78  --      Recent Labs     04/18/25  0935 06/07/24  0932 05/26/23  0908   ALBUMIN 4.2 4.6 4.2   ALKPHOS 87 95 76   ALT 18 29 27   AST 17 21 18   BILITOT 0.6 0.5 0.5       LIPID PANEL -   Recent Labs     04/18/25  0935 06/07/24  0932 05/26/23  0908 04/29/22  1046 11/05/21  1126   CHOL 139 149 133 110 131   LDLCALC 85 95  --   --   --    LDLF  --   --  87 66 78   HDL 36* 35.9 35.3* 29.0* 29.0*   TRIG 86 92 55 75 119       Recent Labs     05/26/23  0908 09/06/19  1112 06/22/18  1010   HGBA1C 5.5 5.8 5.8       Lab Results   Component Value Date    [id number] 403 04/18/2025       Lab Results   Component Value Date    VITD25 41 04/18/2025        Assessment/Plan   Problem List Items Addressed This Visit       Multiple pigmented nevi - Primary    Relevant Orders    Referral to Dermatology     Other Visit Diagnoses         Morbid obesity with BMI of 40.0-44.9, adult (Multi)        Relevant Medications    phentermine (Adipex-P) 37.5 mg tablet        Continue all  current meds.  RTC in one month for recheck, sooner should problems arise.  Medication list reconciled.  Thank you for visiting today!      Professional services: Some of this note was completed using Dragon voice technology and sometimes the software misinterprets words. This may include unintended errors with respect to translation of words, typographical errors or grammar errors which may not have been identified prior to finalization of the chart note. Please take this into account when reading the note. Thank you.       Phil Millan DO 05/13/25 2:31 PM

## 2025-05-29 ENCOUNTER — TELEPHONE (OUTPATIENT)
Dept: PRIMARY CARE | Facility: CLINIC | Age: 55
End: 2025-05-29

## 2025-05-29 NOTE — TELEPHONE ENCOUNTER
Pt states she had a mammogram over a month ago and she just received a letter stating that she needs to have another one. She was seen with PCP 5/13 and this was not mentioned. Is this necessary, please advise.   
The radiologist  would like to get some additional views of your left breast to better clarify some areas found on your screening mammogram.  The new views should be done at a hospital where there is a radiologist present so you do not have to go back and forth several times for additional views.  I will put in the request for additional views and I just need to know what hospital you would like to use.  If it is a  facility, they should be able to see the orders.  If not then we will need to print off the orders and fax it to the hospital.    
not examined

## 2025-06-13 ENCOUNTER — APPOINTMENT (OUTPATIENT)
Dept: PRIMARY CARE | Facility: CLINIC | Age: 55
End: 2025-06-13
Payer: COMMERCIAL

## 2025-06-13 VITALS
TEMPERATURE: 97.6 F | RESPIRATION RATE: 20 BRPM | BODY MASS INDEX: 42.76 KG/M2 | OXYGEN SATURATION: 96 % | SYSTOLIC BLOOD PRESSURE: 133 MMHG | HEART RATE: 85 BPM | WEIGHT: 273 LBS | DIASTOLIC BLOOD PRESSURE: 83 MMHG

## 2025-06-13 DIAGNOSIS — H10.31 ACUTE BACTERIAL CONJUNCTIVITIS OF RIGHT EYE: ICD-10-CM

## 2025-06-13 DIAGNOSIS — E66.01 MORBID OBESITY WITH BMI OF 40.0-44.9, ADULT (MULTI): ICD-10-CM

## 2025-06-13 DIAGNOSIS — J20.9 ACUTE BRONCHITIS, UNSPECIFIED ORGANISM: Primary | ICD-10-CM

## 2025-06-13 PROCEDURE — 99214 OFFICE O/P EST MOD 30 MIN: CPT | Performed by: FAMILY MEDICINE

## 2025-06-13 RX ORDER — TOPIRAMATE 25 MG/1
TABLET, FILM COATED ORAL
Qty: 70 TABLET | Refills: 0 | Status: SHIPPED | OUTPATIENT
Start: 2025-06-13

## 2025-06-13 RX ORDER — DOXYCYCLINE 100 MG/1
100 CAPSULE ORAL 2 TIMES DAILY
Qty: 20 CAPSULE | Refills: 0 | Status: SHIPPED | OUTPATIENT
Start: 2025-06-13

## 2025-06-13 RX ORDER — PHENTERMINE HYDROCHLORIDE 37.5 MG/1
37.5 TABLET ORAL
Qty: 30 TABLET | Refills: 0 | Status: SHIPPED | OUTPATIENT
Start: 2025-06-13 | End: 2025-07-13

## 2025-06-13 RX ORDER — SULFACETAMIDE SODIUM 100 MG/ML
2 SOLUTION/ DROPS OPHTHALMIC 4 TIMES DAILY
Qty: 15 ML | Refills: 0 | Status: SHIPPED | OUTPATIENT
Start: 2025-06-13 | End: 2025-06-20

## 2025-06-13 NOTE — PROGRESS NOTES
Subjective   Patient ID: Nery Devi is a 54 y.o. female who presents for Follow-up (1 month follow up for Adipex. //No questions, concerns, or complaints. /) and Eye Problem (Wednesday she had tried a new eye cream for bags, but since her eye has been irritated, woke the last 2 days with it crusted shut. ).    OARRS:  Phil Millan DO on 6/13/2025  3:13 PM  I have personally reviewed the OARRS report for Nery Devi. I have considered the risks of abuse, dependence, addiction and diversion and I believe that it is clinically appropriate for Nery Devi to be prescribed this medication    Is the patient prescribed a combination of a benzodiazepine and opioid?  No    Last Urine Drug Screen / ordered today: No  Recent Results (from the past 8760 hours)   Drug Screen, Urine With Reflex to Confirmation    Collection Time: 04/18/25  9:22 AM   Result Value Ref Range    Fentanyl NEGATIVE <0.5 ng/mL    Amphetamines NEGATIVE <500 ng/mL    Barbiturates NEGATIVE <300 ng/mL    Benzodiazepines NEGATIVE <100 ng/mL    Cocaine Metabolite NEGATIVE <150 ng/mL    Marijuana Metabolite NEGATIVE <20 ng/mL    Methadone Metabolite NEGATIVE <100 ng/mL    Opiates NEGATIVE <100 ng/mL    Oxycodone NEGATIVE <100 ng/mL    Phencyclidine NEGATIVE <25 ng/mL    Creatinine 90.6 > or = 20.0 mg/dL    pH 5.5 4.5 - 9.0    Oxidant NEGATIVE <200 mcg/mL    Notes and Comments       Results are as expected.     Controlled Substance Agreement:  Date of the Last Agreement: 4/18/2025  Reviewed Controlled Substance Agreement including but not limited to the benefits, risks, and alternatives to treatment with a Controlled Substance medication(s).    Anorexiants:   What is the patient's goal of therapy?  To lose weight  Is this being achieved with current treatment?  Yes  Patient has demonstrated continued efforts to lose weight, is dedicated to the treatment program and the response to treatment. and I have assessed for the presence or absence of  contraindications, adverse effects, and indicators of possible substance abuse that would necessitate cessation of treatment utilizing controlled substance.    Activities of Daily Living:   Is your overall impression that this patient is benefiting (symptom reduction outweighs side effects) from anorexiants therapy? Yes     1. Physical Functioning: Better  2. Family Relationship: Better  3. Social Relationship: Better  4. Mood: Better  5. Sleep Patterns: Better  6. Overall Function: Better    HPI  Patient presents today for 1-month checkup and refill of meds.  She states that she is taking her medicines as directed and is not having any side effects from them.  She so far has lost 5 pounds with the medication since starting.    She currently is also with complaints of redness and irritation of the right eye in the last 2 days.  She states it was crusted shut this morning.  Patient wears a lot of heavy eye make-up.    Patient also smokes 1 to 2 packs a day and has been coughing.  She just recently came back from [place].    Review of Systems   All other systems reviewed and are negative.      Objective   Vitals:  /83   Pulse 85   Temp 36.4 °C (97.6 °F)   Resp 20   Wt 124 kg (273 lb)   LMP  (LMP Unknown)   SpO2 96%   BMI 42.76 kg/m²     Physical Exam  Vitals reviewed.   Constitutional:       Appearance: She is well-developed. She is morbidly obese.   HENT:      Head: Normocephalic and atraumatic.      Right Ear: Tympanic membrane, ear canal and external ear normal.      Left Ear: Tympanic membrane, ear canal and external ear normal.      Mouth/Throat:      Mouth: Mucous membranes are moist.      Pharynx: Oropharynx is clear.   Eyes:      General:         Right eye: Discharge present.      Extraocular Movements: Extraocular movements intact.      Pupils: Pupils are equal, round, and reactive to light.   Cardiovascular:      Rate and Rhythm: Normal rate and regular rhythm.      Heart sounds: Normal heart  sounds. No murmur heard.  Pulmonary:      Effort: Pulmonary effort is normal.      Breath sounds: Rhonchi (Coarse scattered rhonchi bilateral lung fields without wheeze) present. No wheezing.   Abdominal:      General: Abdomen is flat. Bowel sounds are normal.      Palpations: Abdomen is soft.   Musculoskeletal:         General: Normal range of motion.   Skin:     General: Skin is warm and dry.      Comments: Patient has multiple ear piercings and tattoos   Neurological:      General: No focal deficit present.      Mental Status: She is alert and oriented to person, place, and time. Mental status is at baseline.   Psychiatric:         Mood and Affect: Mood normal.         Behavior: Behavior normal.         Thought Content: Thought content normal.         Judgment: Judgment normal.         CBC -  Recent Labs     04/18/25  0935 06/07/24  0932 05/26/23  0908   WBC 10.0 10.3 9.4   HGB 16.6* 17.5* 16.6*   HCT 47.9* 51.2* 50.3*    235 216   MCV 93.4 96 97       CMP -  Recent Labs     04/18/25  0935 06/07/24  0932 05/26/23  0908    140 139   K 4.3 4.5 4.4    106 106   CO2 26 25 27   ANIONGAP 8 14 10   BUN 10 15 12   CREATININE 0.67 0.88 0.84   EGFR 103 78  --      Recent Labs     04/18/25  0935 06/07/24  0932 05/26/23  0908   ALBUMIN 4.2 4.6 4.2   ALKPHOS 87 95 76   ALT 18 29 27   AST 17 21 18   BILITOT 0.6 0.5 0.5       LIPID PANEL -   Recent Labs     04/18/25  0935 06/07/24  0932 05/26/23  0908 04/29/22  1046 11/05/21  1126   CHOL 139 149 133 110 131   LDLCALC 85 95  --   --   --    LDLF  --   --  87 66 78   HDL 36* 35.9 35.3* 29.0* 29.0*   TRIG 86 92 55 75 119       Recent Labs     05/26/23  0908 09/06/19  1112 06/22/18  1010   HGBA1C 5.5 5.8 5.8       Lab Results   Component Value Date    CIUYENFG91 403 04/18/2025       Lab Results   Component Value Date    VITD25 41 04/18/2025        Assessment/Plan   Problem List Items Addressed This Visit       Morbid obesity with BMI of 40.0-44.9, adult (Multi)     Overview   Start phentermine wt 278    4/18/25         Relevant Medications    phentermine (Adipex-P) 37.5 mg tablet    topiramate (Topamax) 25 mg tablet     Other Visit Diagnoses         Acute bronchitis, unspecified organism    -  Primary    Relevant Medications    doxycycline (Monodox) 100 mg capsule      Acute bacterial conjunctivitis of right eye        Relevant Medications    sulfacetamide (Bleph-10) 10 % ophthalmic solution        Use meds as directed.  Return to clinic if symptoms do not improve in 10-14 days.    Should the condition worsen contact me and return here or if after hours seek further evaluation at an urgent care or in the emergency room.  Take new medication as directed.  Continue other medications as directed.  RTC in one month for recheck, sooner should problems arise.     Patient exhibiting no signs of depression and does not need treatment at this time.  Medication list reconciled.  Thank you for visiting today!        Professional services: Some of this note was completed using Dragon voice technology and sometimes the software misinterprets words. This may include unintended errors with respect to translation of words, typographical errors or grammar errors which may not have been identified prior to finalization of the chart note. Please take this into account when reading the note. Thank you.              Phil Millan DO 06/13/25 3:18 PM

## 2025-07-10 ENCOUNTER — TELEPHONE (OUTPATIENT)
Dept: PRIMARY CARE | Facility: CLINIC | Age: 55
End: 2025-07-10
Payer: COMMERCIAL

## 2025-07-10 NOTE — TELEPHONE ENCOUNTER
----- Message from Phil Millan sent at 7/9/2025  8:00 AM EDT -----  Please notify patient that the left breast asymmetry appears to have resolved.  They recommend a recheck mammogram in 6 months just to be sure.  ----- Message -----  From: [facility] - Imaging Results In  Sent: 7/3/2025   2:12 PM EDT  To: Phil Millan, DO

## 2025-07-11 ENCOUNTER — APPOINTMENT (OUTPATIENT)
Dept: PRIMARY CARE | Facility: CLINIC | Age: 55
End: 2025-07-11
Payer: COMMERCIAL

## 2025-07-13 DIAGNOSIS — E66.01 MORBID OBESITY WITH BMI OF 40.0-44.9, ADULT (MULTI): ICD-10-CM

## 2025-07-14 RX ORDER — TOPIRAMATE 25 MG/1
TABLET, FILM COATED ORAL
Qty: 70 TABLET | Refills: 0 | OUTPATIENT
Start: 2025-07-14

## 2025-07-14 NOTE — TELEPHONE ENCOUNTER
Patient called in this morning stating that the Topamax is causing her to be dizzy.  Patient stated that even with lowering the dose she is still getting dizzy and is concerned about this and would like to speak with the doctor.  Patient can be reached at [phone].        Thank You

## 2025-07-16 ENCOUNTER — TELEPHONE (OUTPATIENT)
Dept: PRIMARY CARE | Facility: CLINIC | Age: 55
End: 2025-07-16
Payer: COMMERCIAL

## 2025-07-18 ENCOUNTER — APPOINTMENT (OUTPATIENT)
Dept: PRIMARY CARE | Facility: CLINIC | Age: 55
End: 2025-07-18
Payer: COMMERCIAL

## 2025-07-18 ENCOUNTER — TELEPHONE (OUTPATIENT)
Dept: PRIMARY CARE | Facility: CLINIC | Age: 55
End: 2025-07-18

## 2025-07-18 VITALS
WEIGHT: 271 LBS | DIASTOLIC BLOOD PRESSURE: 72 MMHG | HEART RATE: 87 BPM | TEMPERATURE: 94.4 F | BODY MASS INDEX: 42.44 KG/M2 | RESPIRATION RATE: 20 BRPM | OXYGEN SATURATION: 95 % | SYSTOLIC BLOOD PRESSURE: 136 MMHG

## 2025-07-18 DIAGNOSIS — E66.01 MORBID OBESITY WITH BMI OF 40.0-44.9, ADULT (MULTI): ICD-10-CM

## 2025-07-18 DIAGNOSIS — Z13.228 SCREENING FOR METABOLIC DISORDER: ICD-10-CM

## 2025-07-18 DIAGNOSIS — R42 DIZZINESS: Primary | ICD-10-CM

## 2025-07-18 DIAGNOSIS — J01.90 ACUTE SINUSITIS, RECURRENCE NOT SPECIFIED, UNSPECIFIED LOCATION: ICD-10-CM

## 2025-07-18 DIAGNOSIS — D64.9 ANEMIA, UNSPECIFIED TYPE: ICD-10-CM

## 2025-07-18 PROCEDURE — 99213 OFFICE O/P EST LOW 20 MIN: CPT | Performed by: FAMILY MEDICINE

## 2025-07-18 RX ORDER — TOPIRAMATE 25 MG/1
TABLET, FILM COATED ORAL
Qty: 70 TABLET | Refills: 0 | Status: SHIPPED | OUTPATIENT
Start: 2025-07-18

## 2025-07-18 RX ORDER — TOPIRAMATE 25 MG/1
TABLET, FILM COATED ORAL
Qty: 70 TABLET | Refills: 0 | Status: SHIPPED | OUTPATIENT
Start: 2025-07-18 | End: 2025-07-18

## 2025-07-18 RX ORDER — DOXYCYCLINE 100 MG/1
100 CAPSULE ORAL 2 TIMES DAILY
Qty: 20 CAPSULE | Refills: 0 | Status: SHIPPED | OUTPATIENT
Start: 2025-07-18

## 2025-07-18 RX ORDER — DOXYCYCLINE 100 MG/1
100 CAPSULE ORAL 2 TIMES DAILY
Qty: 20 CAPSULE | Refills: 0 | Status: SHIPPED | OUTPATIENT
Start: 2025-07-18 | End: 2025-07-18 | Stop reason: RX

## 2025-07-18 NOTE — TELEPHONE ENCOUNTER
Patient called in this afternoon stating that she needs the doxycycline to go to [facility] in [place] and not [facility] so that she can start taking this medication.  Patient can be reached at [phone].        Thank You

## 2025-07-18 NOTE — PROGRESS NOTES
Subjective   Patient ID: Nery Devi is a 54 y.o. female who presents for Dizziness (X 2 weeks. She called into office /14 for this. Has been off the topamax for one week. She is unsure if it is her sinus bothering her or if because she had been on a boat recently.).    HPI  Patient comes in today still complaining of dizziness.  She had called last week complaining of the dizziness and thought it may have been due to the Topamax she was on, however we stopped the Topamax medication and she is still dizzy.  She found out the medicine was helping her with weight loss as well and she would like to go back on it.    With regard to the dizziness, she states her sinuses have been hurting and dry.  She has been using a Elberta pot as well as saline and Mucinex DM and nothing seems to be helping.  She is a smoker of 1 pack a day.  She states that the dizziness is more notable when she is standing or walking and not so much when she is sitting.    She is otherwise without complaints.    Review of Systems   All other systems reviewed and are negative.      Objective   Vitals:  /72   Pulse 87   Temp 34.7 °C (94.4 °F)   Resp 20   Wt 123 kg (271 lb)   LMP  (LMP Unknown)   SpO2 95%   BMI 42.44 kg/m²     Physical Exam  Vitals reviewed.   Constitutional:       Appearance: She is well-developed. She is morbidly obese.   HENT:      Head: Normocephalic and atraumatic.      Right Ear: Tympanic membrane, ear canal and external ear normal.      Left Ear: Tympanic membrane, ear canal and external ear normal.      Mouth/Throat:      Mouth: Mucous membranes are moist.      Pharynx: Oropharynx is clear.     Eyes:      Extraocular Movements: Extraocular movements intact.      Pupils: Pupils are equal, round, and reactive to light.       Cardiovascular:      Rate and Rhythm: Normal rate and regular rhythm.      Heart sounds: Normal heart sounds. No murmur heard.  Pulmonary:      Effort: Pulmonary effort is normal.      Breath  sounds: No wheezing.   Abdominal:      General: Abdomen is flat. Bowel sounds are normal.      Palpations: Abdomen is soft.     Musculoskeletal:         General: Normal range of motion.     Skin:     General: Skin is warm and dry.      Comments: Patient has multiple ear piercings and tattoos     Neurological:      General: No focal deficit present.      Mental Status: She is alert and oriented to person, place, and time. Mental status is at baseline.     Psychiatric:         Mood and Affect: Mood normal.         Behavior: Behavior normal.         Thought Content: Thought content normal.         Judgment: Judgment normal.         CBC -  Recent Labs     04/18/25  0935 06/07/24  0932 05/26/23  0908   WBC 10.0 10.3 9.4   HGB 16.6* 17.5* 16.6*   HCT 47.9* 51.2* 50.3*    235 216   MCV 93.4 96 97       CMP -  Recent Labs     04/18/25  0935 06/07/24  0932 05/26/23  0908    140 139   K 4.3 4.5 4.4    106 106   CO2 26 25 27   ANIONGAP 8 14 10   BUN 10 15 12   CREATININE 0.67 0.88 0.84   EGFR 103 78  --      Recent Labs     04/18/25  0935 06/07/24  0932 05/26/23  0908   ALBUMIN 4.2 4.6 4.2   ALKPHOS 87 95 76   ALT 18 29 27   AST 17 21 18   BILITOT 0.6 0.5 0.5       LIPID PANEL -   Recent Labs     04/18/25  0935 06/07/24  0932 05/26/23  0908 04/29/22  1046 11/05/21  1126   CHOL 139 149 133 110 131   LDLCALC 85 95  --   --   --    LDLF  --   --  87 66 78   HDL 36* 35.9 35.3* 29.0* 29.0*   TRIG 86 92 55 75 119       Recent Labs     05/26/23  0908 09/06/19  1112 06/22/18  1010   HGBA1C 5.5 5.8 5.8       Lab Results   Component Value Date    NIGMTOVT89 403 04/18/2025       Lab Results   Component Value Date    VITD25 41 04/18/2025        Assessment/Plan   Problem List Items Addressed This Visit       Morbid obesity with BMI of 40.0-44.9, adult (Multi)    Overview   Start phentermine wt 278    4/18/25         Relevant Medications    topiramate (Topamax) 25 mg tablet    Dizziness - Primary     Other Visit Diagnoses          Anemia, unspecified type        Relevant Orders    CBC      Screening for metabolic disorder        Relevant Orders    Comprehensive Metabolic Panel      Acute sinusitis, recurrence not specified, unspecified location        Relevant Medications    doxycycline (Monodox) 100 mg capsule        Will contact patient with lab results when available.  Use meds as directed.  Return to clinic if symptoms do not improve in 10-14 days.    Should the condition worsen contact me and return here or if after hours seek further evaluation at an urgent care or in the emergency room.     Patient exhibiting no signs of depression and does not need treatment at this time.  Medication list reconciled.  Thank you for visiting today!        Professional services: Some of this note was completed using Dragon voice technology and sometimes the software misinterprets words. This may include unintended errors with respect to translation of words, typographical errors or grammar errors which may not have been identified prior to finalization of the chart note. Please take this into account when reading the note. Thank you.              Phil Millan DO 07/18/25 12:45 PM

## 2025-07-18 NOTE — TELEPHONE ENCOUNTER
Please apologize to patient.  Both of her prescriptions were resent to [facility] locally.  Thank you.

## 2025-07-19 LAB
ALBUMIN SERPL-MCNC: 4.4 G/DL (ref 3.6–5.1)
ALP SERPL-CCNC: 91 U/L (ref 37–153)
ALT SERPL-CCNC: 23 U/L (ref 6–29)
ANION GAP SERPL CALCULATED.4IONS-SCNC: 7 MMOL/L (CALC) (ref 7–17)
AST SERPL-CCNC: 18 U/L (ref 10–35)
BILIRUB SERPL-MCNC: 0.4 MG/DL (ref 0.2–1.2)
BUN SERPL-MCNC: 11 MG/DL (ref 7–25)
CALCIUM SERPL-MCNC: 9.6 MG/DL (ref 8.6–10.4)
CHLORIDE SERPL-SCNC: 107 MMOL/L (ref 98–110)
CO2 SERPL-SCNC: 26 MMOL/L (ref 20–32)
CREAT SERPL-MCNC: 0.82 MG/DL (ref 0.5–1.03)
EGFRCR SERPLBLD CKD-EPI 2021: 84 ML/MIN/1.73M2
ERYTHROCYTE [DISTWIDTH] IN BLOOD BY AUTOMATED COUNT: 12.8 % (ref 11–15)
GLUCOSE SERPL-MCNC: 91 MG/DL (ref 65–139)
HCT VFR BLD AUTO: 50.2 % (ref 35–45)
HGB BLD-MCNC: 16.8 G/DL (ref 11.7–15.5)
MCH RBC QN AUTO: 32.1 PG (ref 27–33)
MCHC RBC AUTO-ENTMCNC: 33.5 G/DL (ref 32–36)
MCV RBC AUTO: 96 FL (ref 80–100)
PLATELET # BLD AUTO: 236 THOUSAND/UL (ref 140–400)
PMV BLD REES-ECKER: 10.7 FL (ref 7.5–12.5)
POTASSIUM SERPL-SCNC: 4.2 MMOL/L (ref 3.5–5.3)
PROT SERPL-MCNC: 7.1 G/DL (ref 6.1–8.1)
RBC # BLD AUTO: 5.23 MILLION/UL (ref 3.8–5.1)
SODIUM SERPL-SCNC: 140 MMOL/L (ref 135–146)
WBC # BLD AUTO: 10.6 THOUSAND/UL (ref 3.8–10.8)

## 2025-07-21 ENCOUNTER — RESULTS FOLLOW-UP (OUTPATIENT)
Dept: PRIMARY CARE | Facility: CLINIC | Age: 55
End: 2025-07-21
Payer: COMMERCIAL

## 2025-07-22 NOTE — TELEPHONE ENCOUNTER
Pt was given results and verbalized understanding. No questions at this time.   She did state that she is still dizzy even after some days taking the ATB. She wanted to make a fu appt for 2 weeks from now as directed in results. She already has an appt for 8/25.   Transferred to  for earlier appt if possible.

## 2025-07-22 NOTE — TELEPHONE ENCOUNTER
----- Message from Phil Millan sent at 7/21/2025  7:37 AM EDT -----  Please notify patient her labs are about the same as they were 3 months ago with no reason from a lab standpoint for your dizziness.    Continue treatment as planned and follow-up in 2 weeks if symptoms not resolved sooner if condition worsens.      ----- Message -----  From: [employer] Results In  Sent: 7/19/2025   8:26 AM EDT  To: Phil Millan DO

## 2025-08-15 DIAGNOSIS — E66.01 MORBID OBESITY WITH BMI OF 40.0-44.9, ADULT (MULTI): ICD-10-CM

## 2025-08-15 RX ORDER — PHENTERMINE HYDROCHLORIDE 37.5 MG/1
37.5 TABLET ORAL
Qty: 30 TABLET | Refills: 0 | Status: SHIPPED | OUTPATIENT
Start: 2025-08-15 | End: 2025-09-14

## 2025-08-15 RX ORDER — TOPIRAMATE 50 MG/1
50 TABLET, FILM COATED ORAL 2 TIMES DAILY
Qty: 180 TABLET | Refills: 0 | Status: SHIPPED | OUTPATIENT
Start: 2025-08-15 | End: 2025-11-13

## 2025-08-25 ENCOUNTER — APPOINTMENT (OUTPATIENT)
Dept: PRIMARY CARE | Facility: CLINIC | Age: 55
End: 2025-08-25
Payer: COMMERCIAL

## 2025-08-25 ENCOUNTER — HOSPITAL ENCOUNTER (OUTPATIENT)
Dept: RADIOLOGY | Facility: CLINIC | Age: 55
Discharge: HOME | End: 2025-08-25
Payer: COMMERCIAL

## 2025-08-25 VITALS
HEART RATE: 84 BPM | DIASTOLIC BLOOD PRESSURE: 82 MMHG | WEIGHT: 276 LBS | SYSTOLIC BLOOD PRESSURE: 124 MMHG | OXYGEN SATURATION: 97 % | TEMPERATURE: 98.1 F | BODY MASS INDEX: 43.23 KG/M2 | RESPIRATION RATE: 20 BRPM

## 2025-08-25 DIAGNOSIS — F17.200 SMOKER UNMOTIVATED TO QUIT: ICD-10-CM

## 2025-08-25 DIAGNOSIS — M79.89 SWELLING OF RIGHT LOWER EXTREMITY: Primary | ICD-10-CM

## 2025-08-25 DIAGNOSIS — R42 DIZZINESS: ICD-10-CM

## 2025-08-25 DIAGNOSIS — E66.01 MORBID OBESITY WITH BMI OF 40.0-44.9, ADULT (MULTI): ICD-10-CM

## 2025-08-25 DIAGNOSIS — M79.89 SWELLING OF RIGHT LOWER EXTREMITY: ICD-10-CM

## 2025-08-25 DIAGNOSIS — M25.471 RIGHT ANKLE SWELLING: ICD-10-CM

## 2025-08-25 PROCEDURE — 73610 X-RAY EXAM OF ANKLE: CPT | Mod: RIGHT SIDE | Performed by: RADIOLOGY

## 2025-08-25 PROCEDURE — 73610 X-RAY EXAM OF ANKLE: CPT | Mod: RT

## 2025-08-25 PROCEDURE — 99214 OFFICE O/P EST MOD 30 MIN: CPT | Performed by: FAMILY MEDICINE

## 2025-08-26 PROBLEM — F17.200 SMOKER UNMOTIVATED TO QUIT: Status: ACTIVE | Noted: 2025-08-26

## 2025-08-27 ENCOUNTER — RESULTS FOLLOW-UP (OUTPATIENT)
Dept: PRIMARY CARE | Facility: CLINIC | Age: 55
End: 2025-08-27
Payer: COMMERCIAL

## 2025-09-05 ENCOUNTER — APPOINTMENT (OUTPATIENT)
Dept: PRIMARY CARE | Facility: CLINIC | Age: 55
End: 2025-09-05
Payer: COMMERCIAL

## 2025-09-12 ENCOUNTER — APPOINTMENT (OUTPATIENT)
Dept: PRIMARY CARE | Facility: CLINIC | Age: 55
End: 2025-09-12
Payer: COMMERCIAL